# Patient Record
Sex: FEMALE | Race: WHITE | Employment: UNEMPLOYED | URBAN - METROPOLITAN AREA
[De-identification: names, ages, dates, MRNs, and addresses within clinical notes are randomized per-mention and may not be internally consistent; named-entity substitution may affect disease eponyms.]

---

## 2022-05-06 ENCOUNTER — HOSPITAL ENCOUNTER (EMERGENCY)
Age: 22
Discharge: HOME | End: 2022-05-06
Payer: MEDICAID

## 2022-05-06 ENCOUNTER — APPOINTMENT (OUTPATIENT)
Dept: GENERAL RADIOLOGY | Age: 22
End: 2022-05-06
Payer: MEDICAID

## 2022-05-06 VITALS
OXYGEN SATURATION: 100 % | HEART RATE: 87 BPM | SYSTOLIC BLOOD PRESSURE: 122 MMHG | RESPIRATION RATE: 20 BRPM | DIASTOLIC BLOOD PRESSURE: 64 MMHG | TEMPERATURE: 98.7 F

## 2022-05-06 DIAGNOSIS — M79.671 RIGHT FOOT PAIN: Primary | ICD-10-CM

## 2022-05-06 PROCEDURE — 73630 X-RAY EXAM OF FOOT: CPT

## 2022-05-06 PROCEDURE — 99283 EMERGENCY DEPT VISIT LOW MDM: CPT

## 2022-05-06 PROCEDURE — 73610 X-RAY EXAM OF ANKLE: CPT

## 2022-05-06 RX ORDER — NAPROXEN 500 MG/1
500 TABLET ORAL 2 TIMES DAILY WITH MEALS
Qty: 30 TABLET | Refills: 0 | Status: SHIPPED | OUTPATIENT
Start: 2022-05-06 | End: 2022-07-14 | Stop reason: ALTCHOICE

## 2022-05-06 ASSESSMENT — ENCOUNTER SYMPTOMS
CONSTIPATION: 0
VOMITING: 0
COLOR CHANGE: 0
SHORTNESS OF BREATH: 0
CHEST TIGHTNESS: 0
DIARRHEA: 0
RESPIRATORY NEGATIVE: 1
COUGH: 0
ABDOMINAL PAIN: 0
BACK PAIN: 0
NAUSEA: 0

## 2022-05-06 ASSESSMENT — PAIN SCALES - GENERAL: PAINLEVEL_OUTOF10: 4

## 2022-05-06 ASSESSMENT — PAIN - FUNCTIONAL ASSESSMENT: PAIN_FUNCTIONAL_ASSESSMENT: 0-10

## 2022-05-07 NOTE — ED NOTES
Discharge and education instructions reviewed. Patient verbalized understanding, teach-back successful. Patient denied questions at this time. No acute distress noted. Patient instructed to follow-up as noted - return to emergency department if symptoms worsen. Patient verbalized understanding. Discharged per EDMD with discharged instructions.        Mel Weeks RN  05/06/22 8162

## 2022-05-07 NOTE — ED PROVIDER NOTES
[facility]        Pt Name: Audie Tracy  MRN: [id number]  [place] 2000  Date of evaluation: 5/6/2022  Provider: YANELIS Burgos  PCP: No primary care provider on file. Note Started: 11:26 PM EDT       ALFREDA. I have evaluated this patient. My supervising physician was available for consultation. CHIEF COMPLAINT       Chief Complaint   Patient presents with    Foot Pain     Patient states R foot pain that has been going on for a while, states pain runs from the back of her foot all the way up her leg, denies any injury/trauma. Can ambulate normally. HISTORY OF PRESENT ILLNESS   (Location, Timing/Onset, Context/Setting, Quality, Duration, Modifying Factors, Severity, Associated Signs and Symptoms)  Note limiting factors. Chief Complaint: Right Foot Pain     Audie Tracy is a 24 y.o. female with no significant past medical history who presents to the ED with complaint of right foot pain. States she has pain to her right foot over the heel and to the base of the foot that she states been ongoing for quite some time. Cannot really remember when the pain started. Patient states she does do a lot of standing, walking and running. Patient states pain is worsened when she first wakes up in the morning and first gets out of bed. States pain will ease off throughout the day but then returns and usually in the evening. Patient states she can ambulate but has pain with ambulation. She denies any pain at rest.  Denies edema, ecchymosis, erythema or warmth. Denies fever or chills. Denies any numbness or tingling. Denies abrasion or laceration. Denies any injury or trauma. Denies any rashes or lesions. States pain is aching rated 4/10. Denies taking any over-the-counter medication for symptom control. Nursing Notes were all reviewed and agreed with or any disagreements were addressed in the HPI.     REVIEW OF SYSTEMS    (2-9 systems for level 4, 10 or more for level 5)     Review of Systems   Constitutional: Positive for activity change. Negative for appetite change, chills and fever. Respiratory: Negative. Negative for cough, chest tightness and shortness of breath. Cardiovascular: Negative. Negative for chest pain, palpitations and leg swelling. Gastrointestinal: Negative for abdominal pain, constipation, diarrhea, nausea and vomiting. Genitourinary: Negative for decreased urine volume, difficulty urinating, dysuria, flank pain, frequency, hematuria and urgency. Musculoskeletal: Positive for arthralgias, gait problem, joint swelling and myalgias. Negative for back pain, neck pain and neck stiffness. Skin: Negative for color change, pallor, rash and wound. Neurological: Negative for dizziness, weakness, light-headedness, numbness and headaches. Positives and Pertinent negatives as per HPI. Except as noted above in the ROS, all other systems were reviewed and negative. PAST MEDICAL HISTORY   History reviewed. No pertinent past medical history. SURGICAL HISTORY   History reviewed. No pertinent surgical history. [address]       Discharge Medication List as of 5/6/2022  9:41 PM            ALLERGIES     Latex and Prozac [fluoxetine]    FAMILYHISTORY     History reviewed. No pertinent family history.        SOCIAL HISTORY       Social History     Tobacco Use    Smoking status: Current Every Day Smoker     Packs/day: 0.50     Types: Cigarettes    Smokeless tobacco: Never Used   Substance Use Topics    Alcohol use: Never    Drug use: Never       SCREENINGS    [place] Coma Scale  Eye Opening: Spontaneous  Best Verbal Response: Oriented  Best Motor Response: Obeys commands  Stella Coma Scale Score: 15        PHYSICAL EXAM    (up to 7 for level 4, 8 or more for level 5)     ED Triage Vitals [05/06/22 2008]   BP Temp Temp Source Pulse Resp SpO2 Height Weight   122/64 98.7 °F (37.1 °C) Oral 87 20 100 % -- --       Physical Exam  Constitutional:       General: She is not in acute distress. Appearance: Normal appearance. She is well-developed. She is not ill-appearing, toxic-appearing or diaphoretic. HENT:      Head: Normocephalic and atraumatic. Right Ear: External ear normal.      Left Ear: External ear normal.   Eyes:      General:         Right eye: No discharge. Left eye: No discharge. Pulmonary:      Effort: Pulmonary effort is normal. No respiratory distress. Breath sounds: No stridor. Musculoskeletal:         General: Normal range of motion. Cervical back: Normal range of motion and neck supple. Comments: Upon examination patient has no tender palpation over the medial or lateral malleolus of the ankle. No tenderness over the knee or to the proximal fibula. There is no bony tenderness to palpation throughout the foot. Negative calcaneal squeeze. Achilles tendon intact with normal Kohler. Negative Homans' sign. There is no edema, ecchymosis, erythema or warmth noted throughout the right lower extremity. No abrasion or laceration. No rashes or lesions. Dorsalis pedis pulse and capillary refill brisk. Sensation intact to the medial lateral aspects of distal toes. Compartments are soft. Does have tender outpatient over the plantar fascia. Full range of motion and strength throughout the right lower extremity at the hip, knee, ankle and foot. Gait deferred at this time. Skin:     General: Skin is warm and dry. Coloration: Skin is not pale. Findings: No erythema or rash. Neurological:      Mental Status: She is alert and oriented to person, place, and time. Psychiatric:         Behavior: Behavior normal.         DIAGNOSTIC RESULTS   LABS:    Labs Reviewed - No data to display    When ordered only abnormal lab results are displayed. All other labs were within normal range or not returned as of this dictation. EKG:  When ordered, EKG's are interpreted by the Emergency Department Physician in the absence of a cardiologist.  Please see their note for interpretation of EKG. RADIOLOGY:   Non-plain film images such as CT, Ultrasound and MRI are read by the radiologist. Plain radiographic images are visualized and preliminarily interpreted by the ED Provider with the below findings:        Interpretation per the Radiologist below, if available at the time of this note:    XR FOOT RIGHT (MIN 3 VIEWS)   Final Result   1. No acute osseous abnormality involving the right ankle   2. No acute osseous abnormality involving the right foot         XR ANKLE RIGHT (MIN 3 VIEWS)   Final Result   1. No acute osseous abnormality involving the right ankle   2. No acute osseous abnormality involving the right foot           XR ANKLE RIGHT (MIN 3 VIEWS)    Result Date: 5/6/2022  EXAMINATION: 3  XRAY VIEWS OF THE RIGHT FOOT; THREE XRAY VIEWS OF THE RIGHT ANKLE 5/6/2022 2:38 pm COMPARISON: None. HISTORY: ORDERING SYSTEM PROVIDED HISTORY: pain TECHNOLOGIST PROVIDED HISTORY: Reason for exam:->pain Reason for Exam: pain FINDINGS: Three views of the right ankle demonstrate no evidence of an acute fracture or traumatic malalignment. The ankle mortise is intact. Soft tissues appear normal. Three views of the right foot demonstrate no evidence of an acute fracture or traumatic malalignment. Joint spaces are preserved. No foreign bodies are noted. 1. No acute osseous abnormality involving the right ankle 2. No acute osseous abnormality involving the right foot     XR FOOT RIGHT (MIN 3 VIEWS)    Result Date: 5/6/2022  EXAMINATION: 3  XRAY VIEWS OF THE RIGHT FOOT; THREE XRAY VIEWS OF THE RIGHT ANKLE 5/6/2022 2:38 pm COMPARISON: None.  HISTORY: ORDERING SYSTEM PROVIDED HISTORY: pain TECHNOLOGIST PROVIDED HISTORY: Reason for exam:->pain Reason for Exam: pain FINDINGS: Three views of the right ankle demonstrate no evidence of an acute fracture or traumatic malalignment. The ankle mortise is intact. Soft tissues appear normal. Three views of the right foot demonstrate no evidence of an acute fracture or traumatic malalignment. Joint spaces are preserved. No foreign bodies are noted. 1. No acute osseous abnormality involving the right ankle 2. No acute osseous abnormality involving the right foot           PROCEDURES   Unless otherwise noted below, none     Procedures    CRITICAL CARE TIME       CONSULTS:  None      EMERGENCY DEPARTMENT COURSE and DIFFERENTIAL DIAGNOSIS/MDM:   Vitals:    Vitals:    05/06/22 2008   BP: 122/64   Pulse: 87   Resp: 20   Temp: 98.7 °F (37.1 °C)   TempSrc: Oral   SpO2: 100%       Patient was given the following medications:  Medications - No data to display        Patient is a 66-year-old female who presents to the ED with complaint of right foot pain. Has had palpation over the plantar fascia on examination. There is full range of motion and strength of the right lower extremity. Distal neurovascular intact. No edema, ecchymosis, erythema or warmth noted. No abrasion or laceration. X-rays obtained and showed no abnormality to the right foot/ankle. Patient suffering from right foot pain. Concern for potential plantar fasciitis. Patient will be given a list of stretches for home. We will start anti-inflammatories. Follow-up with orthopedics. Return to ED if any worsening symptoms. Low suspicion for acute fracture, dislocation, septic arthritis, gout, cellulitis, abscess, DVT, arterial occlusion, nerve injury, vascular injury, compartment syndrome, proximal fibular fracture, Charcot foot, Achilles tendon rupture, Lisfranc injury or other emergent etiology at this time. FINAL IMPRESSION      1.  Right foot pain          DISPOSITION/PLAN   DISPOSITION Decision To Discharge 05/06/2022 09:39:55 PM      PATIENT REFERRED TO:  Edna Nicole MD  [address], [address] [facility]  [phone]    Schedule an appointment as soon as possible for a visit   As needed, If symptoms worsen    [facility] Emergency Department  [address]  Go to   As needed, If symptoms worsen      DISCHARGE MEDICATIONS:  Discharge Medication List as of 5/6/2022  9:41 PM      START taking these medications    Details   naproxen (NAPROSYN) 500 MG tablet Take 1 tablet by mouth 2 times daily (with meals), Disp-30 tablet, R-0Print             DISCONTINUED MEDICATIONS:  Discharge Medication List as of 5/6/2022  9:41 PM                 (Please note that portions of this note were completed with a voice recognition program.  Efforts were made to edit the dictations but occasionally words are mis-transcribed.)    YANELIS Marina (electronically signed)          YANELIS Sabillon  05/06/22 7964

## 2022-05-25 ENCOUNTER — TELEPHONE (OUTPATIENT)
Dept: PRIMARY CARE CLINIC | Age: 22
End: 2022-05-25

## 2022-05-25 ENCOUNTER — OFFICE VISIT (OUTPATIENT)
Dept: PSYCHOLOGY | Age: 22
End: 2022-05-25
Payer: MEDICAID

## 2022-05-25 ENCOUNTER — OFFICE VISIT (OUTPATIENT)
Dept: PRIMARY CARE CLINIC | Age: 22
End: 2022-05-25
Payer: MEDICAID

## 2022-05-25 VITALS
SYSTOLIC BLOOD PRESSURE: 104 MMHG | TEMPERATURE: 97 F | WEIGHT: 137 LBS | BODY MASS INDEX: 24.27 KG/M2 | HEART RATE: 89 BPM | DIASTOLIC BLOOD PRESSURE: 65 MMHG | HEIGHT: 63 IN

## 2022-05-25 DIAGNOSIS — M25.371 ANKLE INSTABILITY, RIGHT: ICD-10-CM

## 2022-05-25 DIAGNOSIS — Z11.59 ENCOUNTER FOR HEPATITIS C SCREENING TEST FOR LOW RISK PATIENT: ICD-10-CM

## 2022-05-25 DIAGNOSIS — F17.200 CURRENT SMOKER: ICD-10-CM

## 2022-05-25 DIAGNOSIS — F41.9 ANXIETY: ICD-10-CM

## 2022-05-25 DIAGNOSIS — Z00.00 ANNUAL PHYSICAL EXAM: Primary | ICD-10-CM

## 2022-05-25 DIAGNOSIS — F31.9 BIPOLAR AFFECTIVE DISORDER, REMISSION STATUS UNSPECIFIED (HCC): Primary | ICD-10-CM

## 2022-05-25 DIAGNOSIS — F51.01 PRIMARY INSOMNIA: ICD-10-CM

## 2022-05-25 DIAGNOSIS — F31.9 BIPOLAR 1 DISORDER (HCC): ICD-10-CM

## 2022-05-25 DIAGNOSIS — F41.1 GAD (GENERALIZED ANXIETY DISORDER): ICD-10-CM

## 2022-05-25 DIAGNOSIS — Z11.4 ENCOUNTER FOR SCREENING FOR HUMAN IMMUNODEFICIENCY VIRUS (HIV): ICD-10-CM

## 2022-05-25 PROCEDURE — 99385 PREV VISIT NEW AGE 18-39: CPT | Performed by: STUDENT IN AN ORGANIZED HEALTH CARE EDUCATION/TRAINING PROGRAM

## 2022-05-25 PROCEDURE — 99204 OFFICE O/P NEW MOD 45 MIN: CPT | Performed by: STUDENT IN AN ORGANIZED HEALTH CARE EDUCATION/TRAINING PROGRAM

## 2022-05-25 PROCEDURE — 90791 PSYCH DIAGNOSTIC EVALUATION: CPT | Performed by: PSYCHOLOGIST

## 2022-05-25 PROCEDURE — 96127 BRIEF EMOTIONAL/BEHAV ASSMT: CPT | Performed by: STUDENT IN AN ORGANIZED HEALTH CARE EDUCATION/TRAINING PROGRAM

## 2022-05-25 RX ORDER — VALACYCLOVIR HYDROCHLORIDE 500 MG/1
500 TABLET, FILM COATED ORAL DAILY
COMMUNITY
Start: 2022-02-25 | End: 2022-05-26 | Stop reason: SDUPTHER

## 2022-05-25 RX ORDER — CARIPRAZINE 1.5 MG/1
1.5 CAPSULE, GELATIN COATED ORAL DAILY
COMMUNITY
Start: 2022-05-02 | End: 2022-05-25

## 2022-05-25 RX ORDER — TRAZODONE HYDROCHLORIDE 50 MG/1
50 TABLET ORAL NIGHTLY PRN
Qty: 30 TABLET | Refills: 5 | Status: SHIPPED | OUTPATIENT
Start: 2022-05-25

## 2022-05-25 ASSESSMENT — ANXIETY QUESTIONNAIRES
7. FEELING AFRAID AS IF SOMETHING AWFUL MIGHT HAPPEN: 1
1. FEELING NERVOUS, ANXIOUS, OR ON EDGE: 3
6. BECOMING EASILY ANNOYED OR IRRITABLE: 3
7. FEELING AFRAID AS IF SOMETHING AWFUL MIGHT HAPPEN: 1-SEVERAL DAYS
6. BECOMING EASILY ANNOYED OR IRRITABLE: 3-NEARLY EVERY DAY
GAD7 TOTAL SCORE: 14
IF YOU CHECKED OFF ANY PROBLEMS ON THIS QUESTIONNAIRE, HOW DIFFICULT HAVE THESE PROBLEMS MADE IT FOR YOU TO DO YOUR WORK, TAKE CARE OF THINGS AT HOME, OR GET ALONG WITH OTHER PEOPLE: VERY DIFFICULT
2. NOT BEING ABLE TO STOP OR CONTROL WORRYING: 2-OVER HALF THE DAYS
2. NOT BEING ABLE TO STOP OR CONTROL WORRYING: 3
GAD7 TOTAL SCORE: 17
5. BEING SO RESTLESS THAT IT IS HARD TO SIT STILL: 3
3. WORRYING TOO MUCH ABOUT DIFFERENT THINGS: 2-OVER HALF THE DAYS
4. TROUBLE RELAXING: 2-OVER HALF THE DAYS
5. BEING SO RESTLESS THAT IT IS HARD TO SIT STILL: 2-OVER HALF THE DAYS
4. TROUBLE RELAXING: 3
1. FEELING NERVOUS, ANXIOUS, OR ON EDGE: 2
3. WORRYING TOO MUCH ABOUT DIFFERENT THINGS: 1

## 2022-05-25 ASSESSMENT — ENCOUNTER SYMPTOMS
CHEST TIGHTNESS: 0
ABDOMINAL PAIN: 0
BACK PAIN: 0
NAUSEA: 0
DIARRHEA: 0
CONSTIPATION: 0
SHORTNESS OF BREATH: 0

## 2022-05-25 ASSESSMENT — PATIENT HEALTH QUESTIONNAIRE - PHQ9
SUM OF ALL RESPONSES TO PHQ QUESTIONS 1-9: 17
SUM OF ALL RESPONSES TO PHQ QUESTIONS 1-9: 17
10. IF YOU CHECKED OFF ANY PROBLEMS, HOW DIFFICULT HAVE THESE PROBLEMS MADE IT FOR YOU TO DO YOUR WORK, TAKE CARE OF THINGS AT HOME, OR GET ALONG WITH OTHER PEOPLE: 1
SUM OF ALL RESPONSES TO PHQ QUESTIONS 1-9: 17
3. TROUBLE FALLING OR STAYING ASLEEP: 3
7. TROUBLE CONCENTRATING ON THINGS, SUCH AS READING THE NEWSPAPER OR WATCHING TELEVISION: 3
8. MOVING OR SPEAKING SO SLOWLY THAT OTHER PEOPLE COULD HAVE NOTICED. OR THE OPPOSITE, BEING SO FIGETY OR RESTLESS THAT YOU HAVE BEEN MOVING AROUND A LOT MORE THAN USUAL: 1
2. FEELING DOWN, DEPRESSED OR HOPELESS: 1
6. FEELING BAD ABOUT YOURSELF - OR THAT YOU ARE A FAILURE OR HAVE LET YOURSELF OR YOUR FAMILY DOWN: 1
SUM OF ALL RESPONSES TO PHQ QUESTIONS 1-9: 17
5. POOR APPETITE OR OVEREATING: 3
SUM OF ALL RESPONSES TO PHQ9 QUESTIONS 1 & 2: 3
1. LITTLE INTEREST OR PLEASURE IN DOING THINGS: 2
4. FEELING TIRED OR HAVING LITTLE ENERGY: 3

## 2022-05-25 NOTE — PROGRESS NOTES
[facility]  Establish care visit   2022    Sanjeev Munoz (:  2000) is a 25 y.o. female, here to establish care. Chief Complaint   Patient presents with   [employer] Other     also needed marito to see crystal         ASSESSMENT/ PLAN  1. Annual physical exam  Screening labs ordered, patient declines all vaccines today. Recommend 150 minutes of exercise weekly and healthy dietary choices. Records request for Pap smear.  - Comprehensive Metabolic Panel; Future  - Hemoglobin A1C; Future  - Lipid Panel; Future    2. Bipolar 1 disorder (HCC)  Uncontrolled, increase Vraylar to 3 mg today. Referrals placed for psychiatry and psychology  - Ambulatory referral to Psychiatry  - Ambulatory referral to Psychology  - cariprazine hcl (VRAYLAR) 3 MG CAPS capsule; Take 1 capsule by mouth daily  Dispense: 30 capsule; Refill: 2    3. Primary insomnia  Uncontrolled, will reinitiate trazodone at a lower dose for better tolerability. - traZODone (DESYREL) 50 MG tablet; Take 1 tablet by mouth nightly as needed for Sleep  Dispense: 30 tablet; Refill: 5    4. OWEN (generalized anxiety disorder)  Uncontrolled, will adjust Vraylar per above and reassess. PHQ-9 Total Score: 17 (2022  8:56 AM)    OWEN 7 SCORE 2022   OWEN-7 Total Score 17     Interpretation of OWEN-7 score: 5-9 = mild anxiety, 10-14 = moderate anxiety, 15+ = severe anxiety. Recommend referral to behavioral health for scores 10 or greater. - Ambulatory referral to Psychiatry  - Ambulatory referral to Psychology    5. Current smoker  Precontemplative about cessation    6. Encounter for hepatitis C screening test for low risk patient  Screen  - Hepatitis C Antibody; Future    7. Encounter for screening for human immunodeficiency virus (HIV)  Screen  - HIV Screen; Future    8. Ankle instability, right  Uncontrolled, likely secondary to recurrent ankle sprains with ligament laxity.   Continue wearing ASO ankle brace as needed and referral placed to physical therapy. X-ray reviewed. - Ambulatory referral to Physical Therapy       Return in about 1 year (around 5/25/2023) for annual physical.    HPI  Patient presents today to establish care with concerns of bipolar, anxiety, insomnia and ankle pain/instability. Endorses previous diagnoses of bipolar, anxiety and currently taking 1.5 mg Vraylar. She is looking to establish with new psychologist and psychiatrist.  Trisha Prudent persistent, uncontrolled anxiety and depression. No recent manic episodes, but states that these are characterized by multiple days without sleep and not feeling tired, rapid and pressured speech. Notes issues with sleep, was previously taking 100 mg of trazodone which caused morning drowsiness. She would like to try a lower dose. She also endorses chronic right ankle pain, instability. States that she may have sprained it while running in seventh grade via eversion injury. She wears a brace daily, but endorses continued weakness.  + Intermittent swelling. No numbness or weakness of her toes. Was seen in the emergency department a few weeks ago, ankle x-ray was unremarkable. Patient lives at home with her ex-, boyfriend, 2 children. She works at [employer]. Birth control is tubal ligation. Last Pap smear was a year ago, and normal.  She declines all vaccines. No regular exercise or dietary plan; states that she eats way too much [employer]. ROS  Review of Systems   Constitutional: Negative for activity change, appetite change, fatigue, fever and unexpected weight change. Eyes: Negative for visual disturbance. Respiratory: Negative for chest tightness and shortness of breath. Cardiovascular: Negative for palpitations and leg swelling. Gastrointestinal: Negative for abdominal pain, constipation, diarrhea and nausea. Genitourinary: Negative for difficulty urinating and dysuria.    Musculoskeletal: Positive for arthralgias and joint swelling. Negative for back pain, gait problem, neck pain and neck stiffness. Neurological: Negative for weakness, numbness and headaches. Psychiatric/Behavioral: Positive for agitation, decreased concentration and sleep disturbance. Negative for self-injury and suicidal ideas. The patient is nervous/anxious. HISTORIES  Current Outpatient Medications on File Prior to Visit   Medication Sig Dispense Refill    valACYclovir (VALTREX) 500 MG tablet Take 500 mg by mouth daily      naproxen (NAPROSYN) 500 MG tablet Take 1 tablet by mouth 2 times daily (with meals) 30 tablet 0     No current facility-administered medications on file prior to visit.         Allergies   Allergen Reactions    Latex     Prozac [Fluoxetine]        Past Medical History:   Diagnosis Date    Bipolar 1 disorder ([facility]        Patient Active Problem List   Diagnosis    OWEN (generalized anxiety disorder)    Primary insomnia    Bipolar 1 disorder ([facility]    Current smoker       Past Surgical History:   Procedure Laterality Date     SECTION      TUBAL LIGATION         Social History     Socioeconomic History    Marital status:      Spouse name: elena kitchen    Number of children: 2    Years of education: Not on file    Highest education level: Not on file   Occupational History     Comment: [employer]   Tobacco Use    Smoking status: Current Every Day Smoker     Packs/day: 0.25     Types: Cigarettes    Smokeless tobacco: Never Used   Vaping Use    Vaping Use: Every day    Substances: Nicotine   Substance and Sexual Activity    Alcohol use: Yes     Comment: occo    Drug use: Yes     Types: Marijuana Rodwyalon Figueroa)     Comment: delta 8    Sexual activity: Yes     Partners: Male     Birth control/protection: None   Other Topics Concern    Not on file   Social History Narrative    She lives with her ex , boyfriend and 2 kids      Social Determinants of Health     Financial Resource Strain:     Difficulty of Paying Living Expenses: Not on file   Food Insecurity:     Worried About [address] [employer] in the Last Year: Not on file    Stephen of Food in the Last Year: Not on file   Transportation Needs:     Lack of Transportation (Medical): Not on file    Lack of Transportation (Non-Medical): Not on file   Physical Activity:     Days of Exercise per Week: Not on file    Minutes of Exercise per Session: Not on file   Stress:     Feeling of Stress : Not on file   Social Connections:     Frequency of Communication with Friends and Family: Not on file    Frequency of Social Gatherings with Friends and Family: Not on file    Attends Ep[employer]opalian Services: Not on file    Active Member of [address] [place] [address] or Organizations: Not on file    Attends Club or Organization Meetings: Not on file    Marital Status: Not on file   Intimate Partner Violence:     Fear of Current or Ex-Partner: Not on file    Emotionally Abused: Not on file    Physically Abused: Not on file    Sexually Abused: Not on file   Housing Stability:     Unable to Pay for Housing in the Last Year: Not on file    Number of [place] in the Last Year: Not on file    Unstable Housing in the Last Year: Not on file        History reviewed. No pertinent family history. PE  Vitals:    05/25/22 0857   BP: 104/65   Site: Right Upper Arm   Position: Sitting   Cuff Size: Medium Adult   Pulse: 89   Temp: 97 °F (36.1 °C)   TempSrc: Temporal   Weight: 137 lb (62.1 kg)   Height: 5' 2.99\" (1.6 m)     Estimated body mass index is 24.27 kg/m² as calculated from the following:    Height as of this encounter: 5' 2.99\" (1.6 m). Weight as of this encounter: 137 lb (62.1 kg). Physical Exam  Vitals reviewed. Constitutional:       General: She is not in acute distress. Appearance: Normal appearance. HENT:      Head: Normocephalic and atraumatic. Eyes:      Extraocular Movements: Extraocular movements intact. Pupils: Pupils are equal, round, and reactive to light. Cardiovascular:      Rate and Rhythm: Normal rate and regular rhythm. Pulses: Normal pulses. Heart sounds: Normal heart sounds. No murmur heard. No gallop. Pulmonary:      Effort: Pulmonary effort is normal.      Breath sounds: Normal breath sounds. No wheezing or rales. Abdominal:      General: Abdomen is flat. Palpations: Abdomen is soft. Musculoskeletal:         General: No tenderness, deformity or signs of injury. Normal range of motion. Cervical back: Normal range of motion. Right lower leg: No edema. Left lower leg: No edema. Comments: Strength and sensation intact right lower extremity, nonantalgic gait. No significant edema, some tenderness with eversion. Plantar and dorsiflexion intact. Skin:     General: Skin is warm and dry. Capillary Refill: Capillary refill takes less than 2 seconds. Neurological:      General: No focal deficit present. Mental Status: She is alert. Sensory: No sensory deficit. Motor: No weakness. Gait: Gait normal.   Psychiatric:         Mood and Affect: Mood normal.         Behavior: Behavior normal.           There is no immunization history on file for this patient. Health Maintenance   Topic Date Due    COVID-19 Vaccine (1) Never done    Pneumococcal 0-64 years Vaccine (1 - PCV) Never done    HPV vaccine (1 - 2-dose series) Never done    Depression Monitoring  Never done    HIV screen  Never done    Chlamydia screen  Never done    Hepatitis C screen  Never done    DTaP/Tdap/Td vaccine (1 - Tdap) Never done    Pap smear  Never done    Flu vaccine (Season Ended) 09/01/2022    Hepatitis A vaccine  Aged Out    Hepatitis B vaccine  Aged Out    Hib vaccine  Aged Out    Meningococcal (ACWY) vaccine  Aged Out    Varicella vaccine  Discontinued       PSH, PMH, SH and  reviewed and noted. Recent and past labs, tests and consults also reviewed. Recent or new meds also reviewed.     Marcellus Krishnamurthy DO Bo    This dictation was generated by voice recognition computer software. Although all attempts are made to edit the dictation for accuracy, there may be errors in the transcription that are not intended.

## 2022-05-25 NOTE — TELEPHONE ENCOUNTER
cariprazine hcl (VRAYLAR) 3 MG CAPS capsule [[id number]]     Order Details  Dose: 3 mg Route: Oral Frequency: DAILY   Dispense Quantity: 30 capsule Refills: 2          Sig: Take 1 capsule by mouth daily         Start Date: 05/25/22 End Date: --   Written Date: 05/25/22 Expiration Date: 05/25/23       Diagnosis Association: Bipolar 1 disorder ([facility] (F31.9)     Providers    Authorizing Provider: Kathy Aguilar DO NPI: [id number]   Ordering User:  Kathy Aguilar DO          Pharmacy    [address], [place] - [address] [phone] - F [phone]   [address]   Phone:  [phone]  Fax:  [phone]
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
## 2022-05-25 NOTE — PATIENT INSTRUCTIONS
1. Call for individual psychotherapy:     [address]  [address],   [address] 3  Phone: [phone]     Fax: [phone]    -confirm insurance  -female therapist  -anxiety/trauma specialist  -in person     2. Continue to take psychiatric medication as prescribed, go to Dr Rich Melton as needed until get to psychiatry  3. Go to Dr Phil Donaldson on 7/19 for medication management (psychiatry)  4.  No further follow up with Dr Dyan Bradley required, return as needed

## 2022-05-26 ENCOUNTER — TELEPHONE (OUTPATIENT)
Dept: PRIMARY CARE CLINIC | Age: 22
End: 2022-05-26

## 2022-05-26 DIAGNOSIS — A60.9 HSV (HERPES SIMPLEX VIRUS) ANOGENITAL INFECTION: Primary | ICD-10-CM

## 2022-05-26 RX ORDER — VALACYCLOVIR HYDROCHLORIDE 500 MG/1
500 TABLET, FILM COATED ORAL DAILY
Qty: 30 TABLET | Refills: 11 | Status: SHIPPED | OUTPATIENT
Start: 2022-05-26

## 2022-05-26 NOTE — TELEPHONE ENCOUNTER
----- Message from Alex Terrell sent at 5/26/2022 12:14 PM EDT -----  Subject: Message to Provider    QUESTIONS  Information for Provider? Brittanyivonne Rogeena will need a prior authorization for the   cariprazine hcl (VRAYLAR) 3 MG CAPS capsule. [facility] Medicaid will not cover   until a PA is given. They can be reached at [phone]. Brittanyivonne Bello can be   reached at [phone] ok to leave a message  ---------------------------------------------------------------------------  --------------  [address]  What is the best way for the office to contact you? OK to leave message on   voicemail  Preferred Call Back Phone Number? [phone]  ---------------------------------------------------------------------------  --------------  SCRIPT ANSWERS  Relationship to Patient?  Self

## 2022-05-26 NOTE — TELEPHONE ENCOUNTER
Submitted PA for Vraylar 3MG capsules  [facility] [employer] [facility] Key: [id number] STATUS: APPROVED 05/26/22 - 05/26/23; Approval letter attached. If this requires a response please respond to the pool ( P [facility]). Thank you please advise patient.

## 2022-05-26 NOTE — TELEPHONE ENCOUNTER
Medication:   Requested Prescriptions     Pending Prescriptions Disp Refills    valACYclovir (VALTREX) 500 MG tablet       Sig: Take 1 tablet by mouth daily        Last Filled:   Reported 02/25/22    Patient Phone Number: [phone] (home)     Last appt: 5/25/2022 Return in about 1 year (around 5/25/2023) for annual physical.    Next appt: Visit date not found    Last OARRS: No flowsheet data found.

## 2022-05-26 NOTE — TELEPHONE ENCOUNTER
----- Message from Pete Kanner sent at 5/26/2022 12:07 PM EDT -----  Subject: Refill Request    QUESTIONS  Name of Medication? valACYclovir (VALTREX) 500 MG tablet  Patient-reported dosage and instructions? 1 a day  How many days do you have left? 1  Preferred Pharmacy? [address]  Pharmacy phone number (if available)? [phone]  Additional Information for Provider? would like a refill of her Valtrex   due to only having one left. It can be sent to [facility] DR TARIK WOLFE. She can   be reached at [phone] ok to leave a message  ---------------------------------------------------------------------------  --------------  [address]  What is the best way for the office to contact you? OK to leave message on   voicemail  Preferred Call Back Phone Number? [phone]  ---------------------------------------------------------------------------  --------------  SCRIPT ANSWERS  Relationship to Patient?  Self

## 2022-06-08 ENCOUNTER — HOSPITAL ENCOUNTER (OUTPATIENT)
Dept: PHYSICAL THERAPY | Age: 22
Setting detail: THERAPIES SERIES
Discharge: HOME | End: 2022-06-08
Payer: MEDICAID

## 2022-06-08 PROCEDURE — 97110 THERAPEUTIC EXERCISES: CPT

## 2022-06-08 PROCEDURE — 97161 PT EVAL LOW COMPLEX 20 MIN: CPT

## 2022-06-08 NOTE — FLOWSHEET NOTE
[address] [place] [address]  Phone: [phone]   Fax: [phone]    Physical Therapy Treatment Note/ Progress Report:     Date:  2022    Patient Name:  Seun Haddad    :  2000  MRN: [id number]  Restrictions/Precautions:    Medical/Treatment Diagnosis Information:  Diagnosis: A11.375 (ICD-10-CM) - Ankle instability, right  Treatment Diagnosis: Decreased R ankle AROM, decreased R ankle strength, decreased R ankle postural control, decreased proximal hip strength  Insurance/Certification information:  PT Insurance Information: Medicaid  Physician Information:  Ashley Zuñiga DO  Plan of care signed (Y/N): []  Yes [x]  No     Date of Patient follow up with Physician: PRN     Progress Report: []  Yes  [x]  No     Date Range for reporting period:  Beginnin22  Ending:     Progress report due (10 Rx/or 30 days whichever is less): visit #10 or 30 (date)     Recertification due (POC duration/ or 90 days whichever is less): visit #12 or 22 (date)     Visit # Insurance Allowable Auth required?  Date Range   1 30; PA required after 30 visits []  Yes  [x]  No        Latex Allergy:  []NO      [x]YES  Preferred Language for Healthcare:   [x]English       []other:    Functional Scale:           Date assessed:  TO physical FS primary measure score = 39; risk adjusted = 53  22    Pain level:  610     SUBJECTIVE:  See eval    OBJECTIVE: See eval      RESTRICTIONS/PRECAUTIONS: anxiety, depression, smoker (>.5 ppd), Bipolar Type I    Exercises/Interventions:     Therapeutic Exercise (84537)  Resistance / level Sets/sec Reps Notes / Cues   Stepper        Gastroc Stretch  HR   Soleus Stretch        Standing hip ABD  Standing hip ext       Resisted R ankle:   - DF   - INV  - EV       Squats with half roll or TG                                   HEP:   - Standing Gastroc Stretch   - Standing Soleus Stretch   - Toe curls   - Seated Ankle Circles   - Tennis ball roll outs    x10 min    Therapeutic Activities (28237)       Step ups                             Neuromuscular Re-ed (51377)       SLS        Wobble board:   - A/P taps  - A/P hold   - M/L taps   - M/L hold       Arch doming       Toe yoga       MWM tib over foot                            Manual Intervention (34297)       Knee mobs/PROM       Tib/Fem Mobs       Patella Mobs       Ankle mobs                         Modalities:     Pt. Education:  -pt educated on diagnosis, prognosis and expectations for rehab  -all pt questions were answered    Home Exercise Program:  Access Code: [email or web address]  URL: [email or web address]  Date: 06/08/2022  Prepared by: Lsahay Luna    Exercises  Gastroc Stretch on Wall - 1 x daily - 7 x weekly - 1 sets - 3 reps - 30 hold  Standing Soleus Stretch - 1 x daily - 7 x weekly - 1 sets - 3 reps - 30 hold  Seated Toe Curl - 1 x daily - 7 x weekly - 2 sets - 10 reps  Supine Ankle Circles - 1 x daily - 7 x weekly - 2 sets - 10 reps      Therapeutic Exercise and NMR EXR  [x] (01223) Provided verbal/tactile cueing for activities related to strengthening, flexibility, endurance, ROM for improvements in LE, proximal hip, and core control with self care, mobility, lifting, ambulation.  [] (97778) Provided verbal/tactile cueing for activities related to improving balance, coordination, kinesthetic sense, posture, motor skill, proprioception  to assist with LE, proximal hip, and core control in self care, mobility, lifting, ambulation and eccentric single leg control.   [] (95635) Therapist is in constant attendance of 2 or more patients providing skilled therapy interventions, but not providing any significant amount of measurable one-on-one time to either patient, for improvements in LE, proximal hip, and core control in self care, mobility, lifting, ambulation and eccentric single leg control.      NMR and Therapeutic Activities:    [] (22491 or 19580) Provided verbal/tactile cueing for activities related to improving balance, coordination, kinesthetic sense, posture, motor skill, proprioception and motor activation to allow for proper function of core, proximal hip and LE with self care and ADLs  [] (98473) Gait Re-education- Provided training and instruction to the patient for proper LE, core and proximal hip recruitment and positioning and eccentric body weight control with ambulation re-education including up and down stairs     Home Exercise Program:    [x] (93420) Reviewed/Progressed HEP activities related to strengthening, flexibility, endurance, ROM of core, proximal hip and LE for functional self-care, mobility, lifting and ambulation/stair navigation   [] (95182)Reviewed/Progressed HEP activities related to improving balance, coordination, kinesthetic sense, posture, motor skill, proprioception of core, proximal hip and LE for self care, mobility, lifting, and ambulation/stair navigation      Manual Treatments:  PROM / STM / Oscillations-Mobs:  G-I, II, III, IV (PA's, Inf., Post.)  [] (10213) Provided manual therapy to mobilize LE, proximal hip and/or LS spine soft tissue/joints for the purpose of modulating pain, promoting relaxation,  increasing ROM, reducing/eliminating soft tissue swelling/inflammation/restriction, improving soft tissue extensibility and allowing for proper ROM for normal function with self care, mobility, lifting and ambulation. Modalities:  [] (19736) Vasopneumatic compression: Utilized vasopneumatic compression to decrease edema / swelling for the purpose of improving mobility and quad tone / recruitment which will allow for increased overall function including but not limited to self-care, transfers, ambulation, and ascending / descending stairs.        Charges:  Timed Code Treatment Minutes: 10   Total Treatment Minutes: 45     [x] EVAL - LOW (99466)   [] EVAL - MOD (72670)  [] EVAL - HIGH (68966)  [] RE-EVAL (37556)  [x] VU(23500) x 1     [] Ionto  [] NMR (75847) x       [] Vaso  [] Manual (98720) x       [] Ultrasound  [] TA x        [] Mech Traction (87964)  [] Aquatic Therapy x      [] ES (un) (86608):   [] Home Management Training x  [] ES(attended) (82077)   [] Dry Needling 1-2 muscles (87170):  [] Dry Needling 3+ muscles (972198  [] Group:      [] Other:     GOALS:  Patient stated goal: \"standing/walking for longer periods of time\"  [] Progressing: [] Met: [] Not Met: [] Adjusted    Therapist goals for Patient:   Short Term Goals: To be achieved in: 2 weeks  1. Independent in HEP and progression per patient tolerance, in order to prevent re-injury. [] Progressing: [] Met: [] Not Met: [] Adjusted  2. Patient will have a decrease in pain to facilitate improvement in movement, function, and ADLs as indicated by Functional Deficits. [] Progressing: [] Met: [] Not Met: [] Adjusted    Long Term Goals: To be achieved in: 6 weeks  1. FOTO score of at least 65 to assist with reaching prior level of function. [] Progressing: [] Met: [] Not Met: [] Adjusted  2. Patient will demonstrate increased R ankle AROM to at least DF to 15 deg, PF to 70 deg, INV to 30 deg, and EV 15 deg to allow patient to demonstrate a more functional gait and return to running. [] Progressing: [] Met: [] Not Met: [] Adjusted  3. Patient will demonstrate an increase in R ankle strength to at least 4+/5  in all directions, and proximal strength to at least 4+/5 to allow patient to tolerate prolonged walking and standing, as well as returning to a running program.   [] Progressing: [] Met: [] Not Met: [] Adjusted  4. Patient will return to functional activities including working 8 hours shifts and recreational running without increased symptoms. [] Progressing: [] Met: [] Not Met: [] Adjusted    Overall Progression Towards Functional goals/ Treatment Progress Update:  [] Patient is progressing as expected towards functional goals listed.     [] Progression is slowed due to complexities/Impairments listed. [] Progression has been slowed due to co-morbidities. [x] Plan just implemented, too soon to assess goals progression <30days   [] Goals require adjustment due to lack of progress  [] Patient is not progressing as expected and requires additional follow up with physician  [] Other    Persisting Functional Limitations/Impairments:  []Sitting [x]Standing   [x]Walking [x]Stairs   []Transfers []ADLs   [x]Squatting/bending []Kneeling  []Housework [x]Job related tasks  []Driving []Sports/Recreation   []Sleeping []Other:    ASSESSMENT:  See eval  Treatment/Activity Tolerance:  [x] Pt able to complete treatment [] Patient limited by fatique  [] Patient limited by pain  [] Patient limited by other medical complications  [] Other:     Prognosis:  [x] Good [] Fair  [] Poor    Patient Requires Follow-up: [x] Yes  [] No    Return to Play:    [x]  N/A   []  Stage 1: Intro to Strength   []  Stage 2: Return to Run and Strength   []  Stage 3: Return to Jump and Strength   []  Stage 4: Dynamic Strength and Agility   []  Stage 5: Sport Specific Training     []  Ready to Return to Play, Meets All Above Stages   []  Not Ready for Return to Sports   Comments:            PLAN: See eval. PT 2x / week for 6 weeks. [] Continue per plan of care [] Alter current plan (see comments)  [x] Plan of care initiated [] Hold pending MD visit [] Discharge    Electronically signed by: Latisha Zhao, PT, DPT  Therapist was present, directed the patient's care, made skilled judgement, and was responsible for assessment and treatment of the patient. Chick Light, SPT      Note: If patient does not return for scheduled/ recommended follow up visits, this note will serve as a discharge from care along with most recent update on progress.

## 2022-06-08 NOTE — PLAN OF CARE
[address] [address], [facility] [address]  Phone: [phone]   Fax: [phone]                                                       Physical Therapy Certification    Dear Jaden Salas DO,    We had the pleasure of evaluating the following patient for physical therapy services at [address] [place] [address]. A summary of our findings can be found in the initial assessment below. This includes our plan of care. If you have any questions or concerns regarding these findings, please do not hesitate to contact me at the office phone number checked above. Thank you for the referral.       Physician Signature:_______________________________Date:__________________  By signing above (or electronic signature), therapists plan is approved by physician      Patient: Destiny Pringle   : 2000   MRN: [id number]  Referring Physician: Jaden Salas DO      Evaluation Date: 2022      Medical Diagnosis Information:  Diagnosis: X38.704 (ICD-10-CM) - Ankle instability, right   Treatment Diagnosis: Decreased R ankle AROM, decreased R ankle strength, decreased R ankle postural control, decreased proximal hip strength                                         Insurance information: PT Insurance Information: Medicaid     Precautions/ Contra-indications: N/A  Latex Allergy:  []NO      [x]YES  Preferred Language for Healthcare:   [x]English       []Other:    C-SSRS Triggered by Intake questionnaire (Past 2 wk assessment ):   [] No, Questionnaire did not trigger screening. [x] Yes, Patient intake triggered C-SSRS Screening     [x] Completed, no further action required. [] Completed, PCP notified via Epic    SUBJECTIVE: Patient stated complaint is R medial ankle pain, pain in the plantar aspect of the foot - centrally, under arch, and underneath the great toe.  Pt reports occasional sharp pain that travels to medial knee with certain activity/movement. In general, R medial ankle has a deep, achey pain constantly (at least a 4/10). She states that standing and walking for prolonged periods of time can bring an onset of N/T in medial ankle/calf. Pt states that in high school (approx 15-11 yo) she twisted her ankle at a track meet which brought on the initial onset of R ankle pain. Current onset was insidious and she has been wearing an ankle stability brace each day for the past month. She has trialed arch supports in her shoes previously but states that it makes her plantar pain worse. She has unfortunately not been able to wear brace at work because it does not fit into tennis shoes, but she has recently purchased shoes that allow brace to fit. She has trialed ice for symptom management but is not fond of the sensation. In general, she states that the morning is more difficult for her ankle but the pain will increasingly get worse with activity throughout the day. Depression screen was completed and negative for further action. Pt states that she already follows up with mental health professional.     Relevant Medical History: anxiety, depression, smoker (>.5 ppd), Bipolar Type I   Functional Outcome: FOTO physical FS primary measure score = 38; Risk adjusted = 53    Pain Scale: 5/10, can get to an 11/10, lowest 4/10  Easing factors: rest, brace, naproxen   Provocative factors: running, walking, standing for long periods of time      Type: [x]Constant   [x]Intermittent  [x]Radiating [x]Localized []other:     Numbness/Tingling: pt stats occasional numbness and tingling that travels from R medial foot to medial knee joint     Occupation/School: works at [employer] part time. A typical shift is 5-8 hours; recently had an episode where after standing/walkng for 1.5 hours she was unable to walk anymore.      Living Status/Prior Level of Function:Prior to this injury / incident, pt was independent with ADLs and IADLs. Continues to be independent. No stairs and walk in shower; if she's had a painful day, she will have difficulty getting in and out of the tub if she chooses that tub/shower. OBJECTIVE:   Palpation: TTP with great toe ext; TTP middle of R TC joint; TTP plantar surface of R foot    Functional Mobility/Transfers:  WNL    Posture: WNL sitting - navicular lower on R, arch present B  + navicular drop in WB B    Bandages/Dressings/Incisions: R ankle stability brace     Gait: (include devices/WB status): no AD; decreased push off with R foot; decreased R DF     Lumbar AROM screen - consider screening at future visits if N/T persists: [] [facility]  [] abnormal:     PROM AROM    L R L R   Hip Flexion       Hip Abduction       Hip ER       Hip IR       Knee Flexion   WNL WNL   Knee Extension   WNL WNL   Dorsiflexion n 0 deg  18 deg 20 deg from neutral before pain   Plantarflexion    73 deg 26 deg   Inversion    30 deg 20 deg   Eversion    20 deg 6 deg       Strength (0-5) / Myotomes Left Right   Hip Flexion - supine     Hip Flexion - seated (L1-2) 5 4   Hip Abduction 4+ 4-   Hip Adduction     Hip ER     Hip IR     Quads (L2-4) 5 4-   Hamstrings 5 4-   Ankle Dorsiflexion (L4-5) 5 4*   Ankle Plantarflexion (S1-2) (seated) 5 5*   Ankle Inversion 5 3*   Ankle Eversion (S1-2) 5 3+*   Great Toe Extension (L5) 5 3+*        Flexibility     Gastroc WNL Mild to mod restriction   Soleus WNL Mild to mod restriction        Hamstrings (90/90)     ITB Meldon Le)     Quads (Ely's)     Hip Flexor Mickeal Cancel)          Girth (cm)     Mid patella     Suprapatellar     Figure 8     Transmalleolar     Metatarsal Heads         Joint mobility:     []Normal    [x]Hypo mild with R A-P glide TC jt and talar tilt   []Hyper    Orthopaedic Special Tests  Positive  Negative  NT Comments    Hip   x    EKTA / Nikita's       FADIR       Scour       Trendelenburg              Knee   x    Lachman's / Anterior Drawer       Posterior Drawer       Varus Stress       Valgus Stress       Natalya's        Appley's       Thessaly's       Patellar Tracking              Ankle       Anterior Drawer  x     Talar Tilt  x     Kohler       Dimitrios's        Navicular Drop x          Balance: tandem balance: 10+ sec, B min increase in sway, SL 10+ sec B increased symptoms on R                          [x] Patient history, allergies, meds reviewed. Medical chart reviewed. See intake form. Review Of Systems (ROS):  [x]Performed Review of systems (Integumentary, CardioPulmonary, Neurological) by intake and observation. Intake form has been scanned into medical record. Patient has been instructed to contact their primary care physician regarding ROS issues if not already being addressed at this time.       Co-morbidities/Complexities (which will affect course of rehabilitation):   []None        []Hx of COVID   Arthritic conditions   []Rheumatoid arthritis (M05.9)  []Osteoarthritis (M19.91)  []Gout   Cardiovascular conditions   []Hypertension (I10)  []Hyperlipidemia (E78.5)  []Angina pectoris (I20)  []Atherosclerosis (I70)  []Pacemaker  []Hx of CABG/stent/  cardiac surgeries   Musculoskeletal conditions   []Disc pathology   []Congenital spine pathologies   []Osteoporosis (M81.8)  []Osteopenia (M85.8)  []Scoliosis       Endocrine conditions   []Hypothyroid (E03.9)  []Hyperthyroid Gastrointestinal conditions   []Constipation (I55.87)   Metabolic conditions   []Morbid obesity (E66.01)  []Diabetes type 1(E10.65) or 2 (E11.65)   []Neuropathy (G60.9)     Cardio/Pulmonary conditions   []Asthma (J45)  []Coughing   []COPD (J44.9)  []CHF  []A-fib   Psychological Disorders  [x]Anxiety (F41.9)  [x]Depression (F32.9)   [x]Other: Bipolar Type I  Developmental Disorders  []Autism (F84.0)  []CP (G80)  []Down Syndrome (Q90.9)  []Developmental delay     Neurological conditions  []Prior Stroke (I69.30)  []Parkinson's (G20)  []Encephalopathy (G93.40)  []MS (G35)  []Post-polio (G14)  []SCI  []TBI  []ALS Other conditions  []Fibromyalgia (M79.7)  []Vertigo  []Syncope  []Kidney Failure  []Cancer      []currently undergoing                treatment  []Pregnancy  []Incontinence   Prior surgeries  []involved limb  []previous spinal surgery  [] section birth  []hysterectomy  []bowel / bladder surgery  []other relevant surgeries   [x]Other:  PMHx of 2 c-sections and tubal ligation           Barriers to/and or personal factors that will affect rehab potential:              []Age  []Sex    [x]Smoker              []Motivation/Lack of Motivation                        [x]Co-Morbidities              []Cognitive Function, education/learning barriers              []Environmental, home barriers              []profession/work barriers  []past PT/medical experience  []other:  Justification:     Falls Risk Assessment (30 days):   [x] Falls Risk assessed and no intervention required. [] Falls Risk assessed and Patient requires intervention due to being higher risk   TUG score (>12s at risk):     [] Falls education provided, including        ASSESSMENT: Pt is a 24 yo female referred to PT for R ankle pain and instability. She presents with deficits in R ankle and great toe AROM, strength, proximal hip strength and ankle postural control. These deficits contribute to pain and decreased functional status. Signs and symptoms are consistent with R posterior tibialis tendinosis and plantar fascitis.  She will benefit from skilled therapy to address these deficits, tolerate work duties and return to recreational running program.    Functional Impairments:     []Noted lumbar/proximal hip/LE joint hypomobility   [x]Decreased LE functional ROM   []Decreased core/proximal hip strength and neuromuscular control   [x]Decreased LE functional strength   [x]Reduced balance/proprioceptive control   []other:      Functional Activity Limitations (from functional questionnaire and intake)   [x]Reduced ability to tolerate prolonged functional positions   []Reduced ability or difficulty with changes of positions or transfers between positions   [x]Reduced ability to maintain good posture and demonstrate good body mechanics with sitting, bending, and lifting   []Reduced ability to sleep   [] Reduced ability or tolerance with driving and/or computer work   [x]Reduced ability to perform lifting, carrying tasks   [x]Reduced ability to squat   []Reduced ability to forward bend   [x]Reduced ability to ambulate prolonged functional periods/distances/surfaces   [x]Reduced ability to ascend/descend stairs   [x]Reduced ability to run, hop, cut or jump   []other:    Participation Restrictions   []Reduced participation in self care activities   []Reduced participation in home management activities   [x]Reduced participation in work activities   [x]Reduced participation in social activities. [x]Reduced participation in sport/recreation activities. Classification :    []Signs/symptoms consistent with post-surgical status including decreased ROM, strength and function.    [x]Signs/symptoms consistent with joint sprain/strain   []Signs/symptoms consistent with patella-femoral syndrome   []Signs/symptoms consistent with knee OA/hip OA   []Signs/symptoms consistent with internal derangement of knee/Hip   []Signs/symptoms consistent with functional hip weakness/NMR control      [x]Signs/symptoms consistent with tendinitis/tendinosis    []signs/symptoms consistent with pathology which may benefit from Dry needling      []other:      Prognosis/Rehab Potential:      []Excellent   [x]Good    []Fair   []Poor    Tolerance of evaluation/treatment:    []Excellent   [x]Good    []Fair   []Poor    Physical Therapy Evaluation Complexity Justification  [x] A history of present problem with:  [] no personal factors and/or comorbidities that impact the plan of care;  [x]1-2 personal factors and/or comorbidities that impact the plan of care  []3 personal factors and/or comorbidities that impact the plan of care  [x] An examination of body systems using standardized tests and measures addressing any of the following: body structures and functions (impairments), activity limitations, and/or participation restrictions;:  [x] a total of 1-2 or more elements   [] a total of 3 or more elements   [] a total of 4 or more elements   [x] A clinical presentation with:  [] stable and/or uncomplicated characteristics   [] evolving clinical presentation with changing characteristics  [] unstable and unpredictable characteristics;   [x] Clinical decision making of [x] Low, [] moderate, [] high complexity using standardized patient assessment instrument and/or measurable assessment of functional outcome. [x] EVAL (LOW) 96390 (typically 15 minutes face-to-face)  [] EVAL (MOD) 84250 (typically 30 minutes face-to-face)  [] EVAL (HIGH) 62917 (typically 45 minutes face-to-face)  [] RE-EVAL     PLAN:   Frequency/Duration:  2 days per week for 6 Weeks:  Interventions:  [x]  Therapeutic exercise including: strength training, ROM, for Lower extremity and core   [x]  NMR activation and proprioception for LE, Glutes and Core   [x]  Manual therapy as indicated for LE, Hip and spine to include: Dry Needling/IASTM, STM, PROM, Gr I-IV mobilizations, manipulation. [x] Modalities as needed that may include: thermal agents, E-stim, Biofeedback, US, iontophoresis as indicated  [x] Patient education on joint protection, postural re-education, activity modification, progression of HEP. HEP instruction: Written HEP instructions provided and reviewed. GOALS:  Patient stated goal: \"standing/walking for longer periods of time\"  [] Progressing: [] Met: [] Not Met: [] Adjusted    Therapist goals for Patient:   Short Term Goals: To be achieved in: 2 weeks  1. Independent in HEP and progression per patient tolerance, in order to prevent re-injury.    [] Progressing: [] Met: [] Not Met: [] Adjusted  2. Patient will have a decrease in pain to facilitate improvement in movement, function, and ADLs as indicated by Functional Deficits. [] Progressing: [] Met: [] Not Met: [] Adjusted    Long Term Goals: To be achieved in: 6 weeks  1. FOTO score of at least 65 to assist with reaching prior level of function. [] Progressing: [] Met: [] Not Met: [] Adjusted  2. Patient will demonstrate increased R ankle AROM to at least DF to 15 deg, PF to 70 deg, INV to 30 deg, and EV 15 deg to allow patient to demonstrate a more functional gait and return to running. [] Progressing: [] Met: [] Not Met: [] Adjusted  3. Patient will demonstrate an increase in R ankle strength to at least 4+/5  in all directions, and proximal strength to at least 4+/5 to allow patient to tolerate prolonged walking and standing, as well as returning to a running program.   [] Progressing: [] Met: [] Not Met: [] Adjusted  4. Patient will return to functional activities including working 8 hours shifts and recreational running without increased symptoms. [] Progressing: [] Met: [] Not Met: [] Adjusted      Electronically signed by:  Pauline Suero, PT, DPT  Therapist was present, directed the patient's care, made skilled judgement, and was responsible for assessment and treatment of the patient.  Jess Fields, SPT

## 2022-06-14 ENCOUNTER — HOSPITAL ENCOUNTER (OUTPATIENT)
Dept: PHYSICAL THERAPY | Age: 22
Setting detail: THERAPIES SERIES
Discharge: HOME | End: 2022-06-14
Payer: MEDICAID

## 2022-06-14 NOTE — FLOWSHEET NOTE
Physical Therapy  Cancellation/No-show Note  Patient Name:  Jona Villagomez  :  2000   Date:  2022  Cancelled visits to date: 0  No-shows to date: 1    Patient status for today's appointment patient:  []  Cancelled  []  Rescheduled appointment  [x]  No-show      Reason given by patient:  [x]  Patient ill (COVID 23)  []  Conflicting appointment  []  No transportation    []  Conflict with work  []  No reason given  []  Other:     Comments:      Phone call information:   [x]  Phone call made today to patient at 3:58pm time at number provided:      [x]  Patient answered, conversation as follows: John Vidales states that she has been diagnosed with COVID-19. Her symptoms started 2 days ago (2022). []  Patient did not answer, message left as follows:  []  Phone call not made today  []  Phone call not needed - pt contacted us to cancel and provided reason for cancellation.      Electronically signed by:  Anum Oneil PT Yes

## 2022-06-16 ENCOUNTER — HOSPITAL ENCOUNTER (OUTPATIENT)
Dept: PHYSICAL THERAPY | Age: 22
Setting detail: THERAPIES SERIES
Discharge: HOME | End: 2022-06-16
Payer: MEDICAID

## 2022-06-16 NOTE — FLOWSHEET NOTE
Physical Therapy  Cancellation/No-show Note  Patient Name:  Jona Villagomez  :  2000   Date:  2022  Cancelled visits to date: 1  No-shows to date: 1    Patient status for today's appointment patient:  [x]  Cancelled   []  Rescheduled appointment  []  No-show      Reason given by patient:  [x]  Patient ill (COVID 23)  []  Conflicting appointment  []  No transportation    []  Conflict with work  []  No reason given  []  Other:     Comments:      Phone call information:   [x]  Phone call made today to patient at 3:58pm time at number provided:      [x]  Patient answered, conversation as follows: Pt is still COVID positive. Requested pt have negative test before returning to next scheduled appt  at 1:30 p. Pt is agreeable. []  Patient did not answer, message left as follows:  []  Phone call not made today  []  Phone call not needed - pt contacted us to cancel and provided reason for cancellation.      Electronically signed by:  Kellen Pinedo, PT

## 2022-06-21 ENCOUNTER — HOSPITAL ENCOUNTER (OUTPATIENT)
Dept: PHYSICAL THERAPY | Age: 22
Setting detail: THERAPIES SERIES
Discharge: HOME | End: 2022-06-21
Payer: MEDICAID

## 2022-06-21 NOTE — FLOWSHEET NOTE
Physical Therapy  Cancellation/No-show Note  Patient Name:  Jona Villagomez  :  2000   Date:  2022  Cancelled visits to date: 1  No-shows to date: 2     Patient status for today's appointment patient:  []  Cancelled   []  Rescheduled appointment  [x]  No-show ,    Reason given by patient:  [x]  Patient ill (COVID 23)  []  Conflicting appointment  []  No transportation    []  Conflict with work  []  No reason given  []  Other:     Comments:      Phone call information:   [x]  Phone call made today to patient at 1:50pm time at number provided:      [x]  Patient answered, conversation as follows: Pt is COVID negative. Pt took a COVID test today, results came through too close to therapy appointment for her to attend. She states that she will be present for her appointment this Thursday at 11:15am.   []  Patient did not answer, message left as follows:  []  Phone call not made today  []  Phone call not needed - pt contacted us to cancel and provided reason for cancellation.      Electronically signed by:  Yossi Miles, PT, DPT, MS

## 2022-06-23 ENCOUNTER — HOSPITAL ENCOUNTER (OUTPATIENT)
Dept: PHYSICAL THERAPY | Age: 22
Setting detail: THERAPIES SERIES
Discharge: HOME | End: 2022-06-23
Payer: MEDICAID

## 2022-06-23 NOTE — FLOWSHEET NOTE
activities related to improving balance, coordination, kinesthetic sense, posture, motor skill, proprioception and motor activation to allow for proper function of core, proximal hip and LE with self care and ADLs  [] (16427) Gait Re-education- Provided training and instruction to the patient for proper LE, core and proximal hip recruitment and positioning and eccentric body weight control with ambulation re-education including up and down stairs     Home Exercise Program:    [x] (58070) Reviewed/Progressed HEP activities related to strengthening, flexibility, endurance, ROM of core, proximal hip and LE for functional self-care, mobility, lifting and ambulation/stair navigation   [] (82307)Reviewed/Progressed HEP activities related to improving balance, coordination, kinesthetic sense, posture, motor skill, proprioception of core, proximal hip and LE for self care, mobility, lifting, and ambulation/stair navigation      Manual Treatments:  PROM / STM / Oscillations-Mobs:  G-I, II, III, IV (PA's, Inf., Post.)  [] (59464) Provided manual therapy to mobilize LE, proximal hip and/or LS spine soft tissue/joints for the purpose of modulating pain, promoting relaxation,  increasing ROM, reducing/eliminating soft tissue swelling/inflammation/restriction, improving soft tissue extensibility and allowing for proper ROM for normal function with self care, mobility, lifting and ambulation. Modalities:  [] (95350) Vasopneumatic compression: Utilized vasopneumatic compression to decrease edema / swelling for the purpose of improving mobility and quad tone / recruitment which will allow for increased overall function including but not limited to self-care, transfers, ambulation, and ascending / descending stairs.        Charges:  Timed Code Treatment Minutes: 10   Total Treatment Minutes: 45     [] EVAL - LOW (79653)   [] EVAL - MOD (82772)  [] EVAL - HIGH (98224)  [] RE-EVAL (72227)  [x] EG(13423) x 1     [] Ionto  [] NMR (00815) x       [] Vaso  [] Manual (19868) x       [] Ultrasound  [] TA x        [] Mech Traction (60438)  [] Aquatic Therapy x      [] ES (un) (45749):   [] Home Management Training x  [] ES(attended) (71519)   [] Dry Needling 1-2 muscles (04492):  [] Dry Needling 3+ muscles (158354  [] Group:      [] Other:     GOALS:  Patient stated goal: \"standing/walking for longer periods of time\"  [] Progressing: [] Met: [] Not Met: [] Adjusted    Therapist goals for Patient:   Short Term Goals: To be achieved in: 2 weeks  1. Independent in HEP and progression per patient tolerance, in order to prevent re-injury. [] Progressing: [] Met: [] Not Met: [] Adjusted  2. Patient will have a decrease in pain to facilitate improvement in movement, function, and ADLs as indicated by Functional Deficits. [] Progressing: [] Met: [] Not Met: [] Adjusted    Long Term Goals: To be achieved in: 6 weeks  1. FOTO score of at least 65 to assist with reaching prior level of function. [] Progressing: [] Met: [] Not Met: [] Adjusted  2. Patient will demonstrate increased R ankle AROM to at least DF to 15 deg, PF to 70 deg, INV to 30 deg, and EV 15 deg to allow patient to demonstrate a more functional gait and return to running. [] Progressing: [] Met: [] Not Met: [] Adjusted  3. Patient will demonstrate an increase in R ankle strength to at least 4+/5  in all directions, and proximal strength to at least 4+/5 to allow patient to tolerate prolonged walking and standing, as well as returning to a running program.   [] Progressing: [] Met: [] Not Met: [] Adjusted  4. Patient will return to functional activities including working 8 hours shifts and recreational running without increased symptoms. [] Progressing: [] Met: [] Not Met: [] Adjusted    Overall Progression Towards Functional goals/ Treatment Progress Update:  [] Patient is progressing as expected towards functional goals listed.     [] Progression is slowed due to complexities/Impairments listed. [] Progression has been slowed due to co-morbidities. [x] Plan just implemented, too soon to assess goals progression <30days   [] Goals require adjustment due to lack of progress  [] Patient is not progressing as expected and requires additional follow up with physician  [] Other    Persisting Functional Limitations/Impairments:  []Sitting [x]Standing   [x]Walking [x]Stairs   []Transfers []ADLs   [x]Squatting/bending []Kneeling  []Housework [x]Job related tasks  []Driving []Sports/Recreation   []Sleeping []Other:    ASSESSMENT:  See eval  Treatment/Activity Tolerance:  [x] Pt able to complete treatment [] Patient limited by fatique  [] Patient limited by pain  [] Patient limited by other medical complications  [] Other:     Prognosis:  [x] Good [] Fair  [] Poor    Patient Requires Follow-up: [x] Yes  [] No    Return to Play:    [x]  N/A   []  Stage 1: Intro to Strength   []  Stage 2: Return to Run and Strength   []  Stage 3: Return to Jump and Strength   []  Stage 4: Dynamic Strength and Agility   []  Stage 5: Sport Specific Training     []  Ready to Return to Play, Meets All Above Stages   []  Not Ready for Return to Sports   Comments:            PLAN: See eval. PT 2x / week for 6 weeks. [x] Continue per plan of care [] Alter current plan (see comments)  [] Plan of care initiated [] Hold pending MD visit [] Discharge    Electronically signed by: Elizabeth Townsend, PT, DPT  Therapist was present, directed the patient's care, made skilled judgement, and was responsible for assessment and treatment of the patient. Fatemeh Mireles, SPT      Note: If patient does not return for scheduled/ recommended follow up visits, this note will serve as a discharge from care along with most recent update on progress.

## 2022-06-23 NOTE — FLOWSHEET NOTE
Physical Therapy  Cancellation/No-show Note  Patient Name:  Vashti Campbell  :  2000   Date:  2022  Cancelled visits to date: 1  No-shows to date: 2, 1 (pt no showed 11:15 am appt then RS to 3 pm)     Patient status for today's appointment patient:  []  Cancelled   []  Rescheduled appointment  [x]  No-show , ,    Reason given by patient:  []  Patient ill (COVID 19)  []  Conflicting appointment  []  No transportation    []  Conflict with work  [x]  No reason given  []  Other:     Comments:      Phone call information:   [x]  Phone call made today to patient at 11:45 am time at number provided:   [phone]    [x]  Patient answered, conversation as follows: Pt states she forgot about today's appt. She missed several appts due to being COVID positive. She is now COVID negative and would like to come in later today. Pt offered 3 pm appt time. Says she will be here this afternoon. []  Patient did not answer, message left as follows:  []  Phone call not made today  []  Phone call not needed - pt contacted us to cancel and provided reason for cancellation. Electronically signed by:  Cesar Orellana, PT, DPT  Therapist was present, directed the patient's care, made skilled judgement, and was responsible for assessment and treatment of the patient.  Jess Fields, SPT

## 2022-06-28 ENCOUNTER — APPOINTMENT (OUTPATIENT)
Dept: PHYSICAL THERAPY | Age: 22
End: 2022-06-28
Payer: MEDICAID

## 2022-06-30 ENCOUNTER — APPOINTMENT (OUTPATIENT)
Dept: PHYSICAL THERAPY | Age: 22
End: 2022-06-30
Payer: MEDICAID

## 2022-07-12 ENCOUNTER — HOSPITAL ENCOUNTER (OUTPATIENT)
Dept: PHYSICAL THERAPY | Age: 22
Setting detail: THERAPIES SERIES
Discharge: HOME | End: 2022-07-12

## 2022-07-12 NOTE — FLOWSHEET NOTE
Physical Therapy  Cancellation/No-show Note  Patient Name:  Medina Valle  :  2000   Date:  2022  Cancelled visits to date: 1  No-shows to date: 5     Patient status for today's appointment patient:  []  Cancelled   []  Rescheduled appointment  [x]  No-show , , ,   (pt no showed twice on  11:15 am appt then RS to 3 pm),    Reason given by patient:  []  Patient ill (COVID 19)  []  Conflicting appointment  []  No transportation    []  Conflict with work  [x]  No reason given  []  Other:     Comments:      Phone call information:   []  Phone call made today to patient at 11:45 am time at number provided:   [phone]    []  Patient answered, conversation as follows:    []  Patient did not answer, message left as follows:   [x]  Phone call not made today-Pt considered DC-ed this date per no show cancel policy. Please see eval for most recent objective measures. []  Phone call not needed - pt contacted us to cancel and provided reason for cancellation.      Electronically signed by:  Zohreh Bose, PT, DPT

## 2022-07-14 ENCOUNTER — OFFICE VISIT (OUTPATIENT)
Dept: PRIMARY CARE CLINIC | Age: 22
End: 2022-07-14
Payer: MEDICAID

## 2022-07-14 VITALS
WEIGHT: 144 LBS | TEMPERATURE: 97 F | HEIGHT: 62 IN | SYSTOLIC BLOOD PRESSURE: 102 MMHG | DIASTOLIC BLOOD PRESSURE: 64 MMHG | BODY MASS INDEX: 26.5 KG/M2 | HEART RATE: 80 BPM

## 2022-07-14 DIAGNOSIS — S76.212A INGUINAL STRAIN, LEFT, INITIAL ENCOUNTER: Primary | ICD-10-CM

## 2022-07-14 PROCEDURE — 99213 OFFICE O/P EST LOW 20 MIN: CPT | Performed by: STUDENT IN AN ORGANIZED HEALTH CARE EDUCATION/TRAINING PROGRAM

## 2022-07-14 RX ORDER — NAPROXEN 500 MG/1
500 TABLET ORAL 2 TIMES DAILY WITH MEALS
Qty: 28 TABLET | Refills: 0 | Status: SHIPPED | OUTPATIENT
Start: 2022-07-14 | End: 2022-09-28

## 2022-07-14 ASSESSMENT — ENCOUNTER SYMPTOMS
DIARRHEA: 0
BACK PAIN: 0
ABDOMINAL PAIN: 0
CONSTIPATION: 0

## 2022-07-14 NOTE — PROGRESS NOTES
[facility] Primary Care  2022    Fatimah Linton (:  2000) is a 25 y.o. female, here for evaluation of the following medical concerns:    Chief Complaint   Patient presents with    Other     mass on left inner thigh hard ball with fluid         ASSESSMENT/ PLAN  1. Inguinal strain, left, initial encounter  Uncontrolled, this is a new problem. Could be related to hernia which has already been reduced. Exam is benign aside from tenderness in the area. Differential also includes referred back or hip pain. Initiate course of naproxen, follow-up if persistent or worsening. Home physical therapy exercises provided  - naproxen (NAPROSYN) 500 MG tablet; Take 1 tablet by mouth 2 times daily (with meals)  Dispense: 28 tablet; Refill: 0       Return if symptoms worsen or fail to improve. HPI  Patient presents today with concerns of left groin pain. Notes that it began about a week ago, insidious onset. Is associated with pain in her low back and hip. No known injury. No skin changes. She states that she could palpate a mass, which has resolved. She has not tried any medications for this problem. No abdominal or urinary complaints. No history of hernia. ROS  Review of Systems   Constitutional: Negative for activity change, fever and unexpected weight change. Eyes: Negative for visual disturbance. Cardiovascular: Negative for leg swelling. Gastrointestinal: Negative for abdominal pain, constipation and diarrhea. Genitourinary: Negative for difficulty urinating and dysuria. Musculoskeletal: Negative for back pain, gait problem, neck pain and neck stiffness. Neurological: Negative for weakness, numbness and headaches.        HISTORIES  Current Outpatient Medications on File Prior to Visit   Medication Sig Dispense Refill    valACYclovir (VALTREX) 500 MG tablet Take 1 tablet by mouth daily 30 tablet 11    cariprazine hcl (VRAYLAR) 3 MG CAPS capsule Take 1 capsule by mouth daily 30 capsule 2    traZODone (DESYREL) 50 MG tablet Take 1 tablet by mouth nightly as needed for Sleep 30 tablet 5     No current facility-administered medications on file prior to visit. Past Medical History:   Diagnosis Date    Bipolar 1 disorder ([facility]      Patient Active Problem List   Diagnosis    OWEN (generalized anxiety disorder)    Primary insomnia    Bipolar 1 disorder ([facility]    Current smoker       PE  Vitals:    07/14/22 1438   BP: 102/64   Site: Left Upper Arm   Position: Sitting   Cuff Size: Medium Adult   Pulse: 80   Temp: 97 °F (36.1 °C)   TempSrc: Temporal   Weight: 144 lb (65.3 kg)   Height: 5' 2\" (1.575 m)     Estimated body mass index is 26.34 kg/m² as calculated from the following:    Height as of this encounter: 5' 2\" (1.575 m). Weight as of this encounter: 144 lb (65.3 kg). Physical Exam  Vitals reviewed. Constitutional:       General: She is not in acute distress. Appearance: Normal appearance. HENT:      Head: Normocephalic and atraumatic. Musculoskeletal:         General: Tenderness present. No deformity or signs of injury. Normal range of motion. Cervical back: Normal range of motion. Right lower leg: No edema. Left lower leg: No edema. Comments: Tenderness to palpation left groin, exam negative for hernia   Skin:     General: Skin is warm and dry. Capillary Refill: Capillary refill takes less than 2 seconds. Neurological:      General: No focal deficit present. Mental Status: She is alert. Sensory: No sensory deficit. Motor: No weakness. Gait: Gait normal.   Psychiatric:         Mood and Affect: Mood normal.         Behavior: Behavior normal.         Dipika Christopher DO    This dictation was generated by voice recognition computer software. Although all attempts are made to edit the dictation for accuracy, there may be errors in the transcription that are not intended.

## 2022-07-14 NOTE — PATIENT INSTRUCTIONS
Patient Education        Hip Flexor Strain: Rehab Exercises  Introduction  Here are some examples of exercises for you to try. The exercises may be suggested for a condition or for rehabilitation. Start each exercise slowly. Ease off the exercises if you start to have pain. You will be told when to start these exercises and which ones will work bestfor you. How to do the exercises  Pelvic tilt with marching    1. Lie on your back with your knees bent and your feet flat on the floor. 2. Tighten your belly muscles and buttocks, and press your lower back to the floor. 3. Keeping your knees bent, lift and then lower one leg up off the floor, and then lift and lower your other leg like you are marching. Each time you lift your leg, hold that position for about 6 seconds before lowering your leg. 4. Repeat 8 to 12 times. Scissors    1. Lie on your back with your knees bent at a 90-degree angle and your feet off the floor. 2. Tighten your belly muscles and buttocks, and press your lower back to the floor. 3. Slowly straighten one leg, and hold that position for about 6 seconds. Your leg should be about 12 inches off the floor. Bring that leg back to the starting position, and then straighten your other leg. Hold that position for about 6 seconds, and then switch legs again. 4. Repeat 8 to 12 times. Hamstring stretch (lying down)    1. Lie flat on your back with your legs straight. If you feel discomfort in your back, place a small towel roll under your lower back. 2. Holding the back of your affected leg for support, lift your leg straight up and toward your body until you feel a stretch at the back of your thigh. 3. Hold the stretch for at least 30 seconds. 4. Repeat 2 to 4 times. Quadricep and hip flexor stretch (lying on side)    1. Lie on your side with your good leg flat on the floor and your hand supporting your head.   2. Bend your top leg, and reach behind you to grab the front of that foot or ankle with your other hand. 3. Stretch your leg back by pulling your foot toward your buttock. You will feel the stretch in the front of your thigh. If this causes stress on your knee, do not do this stretch. 4. Hold the stretch for at least 15 to 30 seconds. 5. Repeat 2 to 4 times. Hip flexor stretch (kneeling)    1. Kneel on your affected leg and bend your good leg out in front of you, with that foot flat on the floor. If you feel discomfort in the front of your knee, place a towel under your knee. 2. Keeping your back straight, slowly push your hips forward until you feel a stretch in the upper thigh of your back leg and hip. 3. Hold the stretch for at least 15 to 30 seconds. 4. Repeat 2 to 4 times. Hip flexor stretch (edge of table)    1. Lie flat on your back on a table or flat bench, with your knees and lower legs hanging off the edge of the table. 2. Grab your good leg at the knee, and pull that knee back toward your chest. Relax your affected leg and let it hang down toward the floor until you feel a stretch in the upper thigh of your affected leg and hip. 3. Hold the stretch for at least 15 to 30 seconds. 4. Repeat 2 to 4 times. Follow-up care is a key part of your treatment and safety. Be sure to make and go to all appointments, and call your doctor if you are having problems. It's also a good idea to know your test results and keep alist of the medicines you take. Where can you learn more? Go to https://[employer]. org and sign in to your [employer] account. Enter L398 in the [employer] box to learn more about \"Hip Flexor Strain: Rehab Exercises. \"     If you do not have an account, please click on the \"Sign Up Now\" link. Current as of: March 9, 2022               Content Version: 13.3  © 2006-2022 Healthwise, Inc[employer]. Care instructions adapted under license by [facility] ([facility]).  If you have questions about a medical condition or this instruction, always ask your healthcare professional. [address] any warranty or liability for your use of this information.
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
## 2022-07-19 ENCOUNTER — OFFICE VISIT (OUTPATIENT)
Dept: PSYCHIATRY | Age: 22
End: 2022-07-19
Payer: MEDICAID

## 2022-07-19 VITALS
HEART RATE: 79 BPM | BODY MASS INDEX: 26.08 KG/M2 | WEIGHT: 142.6 LBS | SYSTOLIC BLOOD PRESSURE: 97 MMHG | DIASTOLIC BLOOD PRESSURE: 66 MMHG

## 2022-07-19 DIAGNOSIS — F60.3 BORDERLINE PERSONALITY DISORDER (HCC): ICD-10-CM

## 2022-07-19 DIAGNOSIS — F41.0 GENERALIZED ANXIETY DISORDER WITH PANIC ATTACKS: Primary | ICD-10-CM

## 2022-07-19 DIAGNOSIS — F41.1 GENERALIZED ANXIETY DISORDER WITH PANIC ATTACKS: Primary | ICD-10-CM

## 2022-07-19 DIAGNOSIS — F43.12 CHRONIC POST-TRAUMATIC STRESS DISORDER: ICD-10-CM

## 2022-07-19 DIAGNOSIS — F33.1 MODERATE EPISODE OF RECURRENT MAJOR DEPRESSIVE DISORDER (HCC): ICD-10-CM

## 2022-07-19 DIAGNOSIS — F40.10 SOCIAL ANXIETY DISORDER: ICD-10-CM

## 2022-07-19 PROCEDURE — 90833 PSYTX W PT W E/M 30 MIN: CPT | Performed by: STUDENT IN AN ORGANIZED HEALTH CARE EDUCATION/TRAINING PROGRAM

## 2022-07-19 PROCEDURE — 99204 OFFICE O/P NEW MOD 45 MIN: CPT | Performed by: STUDENT IN AN ORGANIZED HEALTH CARE EDUCATION/TRAINING PROGRAM

## 2022-07-19 ASSESSMENT — ANXIETY QUESTIONNAIRES
6. BECOMING EASILY ANNOYED OR IRRITABLE: 3
IF YOU CHECKED OFF ANY PROBLEMS ON THIS QUESTIONNAIRE, HOW DIFFICULT HAVE THESE PROBLEMS MADE IT FOR YOU TO DO YOUR WORK, TAKE CARE OF THINGS AT HOME, OR GET ALONG WITH OTHER PEOPLE: EXTREMELY DIFFICULT
GAD7 TOTAL SCORE: 14
5. BEING SO RESTLESS THAT IT IS HARD TO SIT STILL: 2
1. FEELING NERVOUS, ANXIOUS, OR ON EDGE: 2
3. WORRYING TOO MUCH ABOUT DIFFERENT THINGS: 2
4. TROUBLE RELAXING: 2
2. NOT BEING ABLE TO STOP OR CONTROL WORRYING: 2
7. FEELING AFRAID AS IF SOMETHING AWFUL MIGHT HAPPEN: 1

## 2022-07-19 ASSESSMENT — PATIENT HEALTH QUESTIONNAIRE - PHQ9
8. MOVING OR SPEAKING SO SLOWLY THAT OTHER PEOPLE COULD HAVE NOTICED. OR THE OPPOSITE, BEING SO FIGETY OR RESTLESS THAT YOU HAVE BEEN MOVING AROUND A LOT MORE THAN USUAL: 1
SUM OF ALL RESPONSES TO PHQ9 QUESTIONS 1 & 2: 4
SUM OF ALL RESPONSES TO PHQ QUESTIONS 1-9: 17
4. FEELING TIRED OR HAVING LITTLE ENERGY: 3
SUM OF ALL RESPONSES TO PHQ QUESTIONS 1-9: 17
6. FEELING BAD ABOUT YOURSELF - OR THAT YOU ARE A FAILURE OR HAVE LET YOURSELF OR YOUR FAMILY DOWN: 1
7. TROUBLE CONCENTRATING ON THINGS, SUCH AS READING THE NEWSPAPER OR WATCHING TELEVISION: 2
SUM OF ALL RESPONSES TO PHQ QUESTIONS 1-9: 17
2. FEELING DOWN, DEPRESSED OR HOPELESS: 2
SUM OF ALL RESPONSES TO PHQ QUESTIONS 1-9: 17
3. TROUBLE FALLING OR STAYING ASLEEP: 3
5. POOR APPETITE OR OVEREATING: 3
1. LITTLE INTEREST OR PLEASURE IN DOING THINGS: 2
10. IF YOU CHECKED OFF ANY PROBLEMS, HOW DIFFICULT HAVE THESE PROBLEMS MADE IT FOR YOU TO DO YOUR WORK, TAKE CARE OF THINGS AT HOME, OR GET ALONG WITH OTHER PEOPLE: 2
9. THOUGHTS THAT YOU WOULD BE BETTER OFF DEAD, OR OF HURTING YOURSELF: 0

## 2022-07-19 ASSESSMENT — ENCOUNTER SYMPTOMS
GASTROINTESTINAL NEGATIVE: 1
EYES NEGATIVE: 1
RESPIRATORY NEGATIVE: 1
ALLERGIC/IMMUNOLOGIC NEGATIVE: 1

## 2022-08-23 ENCOUNTER — OFFICE VISIT (OUTPATIENT)
Dept: PSYCHIATRY | Age: 22
End: 2022-08-23
Payer: MEDICAID

## 2022-08-23 VITALS — SYSTOLIC BLOOD PRESSURE: 100 MMHG | DIASTOLIC BLOOD PRESSURE: 70 MMHG | BODY MASS INDEX: 25.79 KG/M2 | WEIGHT: 141 LBS

## 2022-08-23 DIAGNOSIS — F60.3 BORDERLINE PERSONALITY DISORDER (HCC): ICD-10-CM

## 2022-08-23 DIAGNOSIS — F40.10 SOCIAL ANXIETY DISORDER: ICD-10-CM

## 2022-08-23 DIAGNOSIS — F41.0 GENERALIZED ANXIETY DISORDER WITH PANIC ATTACKS: Primary | ICD-10-CM

## 2022-08-23 DIAGNOSIS — F43.12 CHRONIC POST-TRAUMATIC STRESS DISORDER: ICD-10-CM

## 2022-08-23 DIAGNOSIS — F41.1 GENERALIZED ANXIETY DISORDER WITH PANIC ATTACKS: Primary | ICD-10-CM

## 2022-08-23 DIAGNOSIS — F33.1 MODERATE EPISODE OF RECURRENT MAJOR DEPRESSIVE DISORDER (HCC): ICD-10-CM

## 2022-08-23 PROCEDURE — 99214 OFFICE O/P EST MOD 30 MIN: CPT | Performed by: STUDENT IN AN ORGANIZED HEALTH CARE EDUCATION/TRAINING PROGRAM

## 2022-08-23 RX ORDER — DULOXETIN HYDROCHLORIDE 30 MG/1
30 CAPSULE, DELAYED RELEASE ORAL DAILY
Qty: 30 CAPSULE | Refills: 0 | Status: SHIPPED | OUTPATIENT
Start: 2022-08-23 | End: 2022-09-20

## 2022-08-23 ASSESSMENT — ANXIETY QUESTIONNAIRES
3. WORRYING TOO MUCH ABOUT DIFFERENT THINGS: 2
6. BECOMING EASILY ANNOYED OR IRRITABLE: 3
1. FEELING NERVOUS, ANXIOUS, OR ON EDGE: 3
5. BEING SO RESTLESS THAT IT IS HARD TO SIT STILL: 2
GAD7 TOTAL SCORE: 17
2. NOT BEING ABLE TO STOP OR CONTROL WORRYING: 2
4. TROUBLE RELAXING: 3
7. FEELING AFRAID AS IF SOMETHING AWFUL MIGHT HAPPEN: 2

## 2022-08-23 ASSESSMENT — ENCOUNTER SYMPTOMS
ALLERGIC/IMMUNOLOGIC NEGATIVE: 1
EYES NEGATIVE: 1
NAUSEA: 1
RESPIRATORY NEGATIVE: 1

## 2022-08-23 ASSESSMENT — PATIENT HEALTH QUESTIONNAIRE - PHQ9
6. FEELING BAD ABOUT YOURSELF - OR THAT YOU ARE A FAILURE OR HAVE LET YOURSELF OR YOUR FAMILY DOWN: 1
SUM OF ALL RESPONSES TO PHQ9 QUESTIONS 1 & 2: 6
1. LITTLE INTEREST OR PLEASURE IN DOING THINGS: 3
4. FEELING TIRED OR HAVING LITTLE ENERGY: 3
3. TROUBLE FALLING OR STAYING ASLEEP: 3
SUM OF ALL RESPONSES TO PHQ QUESTIONS 1-9: 20
9. THOUGHTS THAT YOU WOULD BE BETTER OFF DEAD, OR OF HURTING YOURSELF: 1
10. IF YOU CHECKED OFF ANY PROBLEMS, HOW DIFFICULT HAVE THESE PROBLEMS MADE IT FOR YOU TO DO YOUR WORK, TAKE CARE OF THINGS AT HOME, OR GET ALONG WITH OTHER PEOPLE: 3
SUM OF ALL RESPONSES TO PHQ QUESTIONS 1-9: 20
7. TROUBLE CONCENTRATING ON THINGS, SUCH AS READING THE NEWSPAPER OR WATCHING TELEVISION: 3
SUM OF ALL RESPONSES TO PHQ QUESTIONS 1-9: 19
2. FEELING DOWN, DEPRESSED OR HOPELESS: 3
SUM OF ALL RESPONSES TO PHQ QUESTIONS 1-9: 20
5. POOR APPETITE OR OVEREATING: 2
8. MOVING OR SPEAKING SO SLOWLY THAT OTHER PEOPLE COULD HAVE NOTICED. OR THE OPPOSITE, BEING SO FIGETY OR RESTLESS THAT YOU HAVE BEEN MOVING AROUND A LOT MORE THAN USUAL: 1

## 2022-08-23 NOTE — PROGRESS NOTES
depressive disorder ([facility] [address]    Borderline personality disorder ([facility]    Social anxiety disorder        Brief Psych Hx:  Hosp: 2x in 2015 after suicide attempts as well as a 6mo stay in a treatment facility as a teenager  Diagnoses: Bipolar disorder, depression, anxiety  Med trials: fluoxetine, lithium, sertraline (suicidal), cariprazine  Outpt: Previous psychiatrists since age 15, has a history of therapy/counseling since that time as well  NSSI: History of cutting, last done about 1mo ago. Tops of thighs and on her arms  Suicide Attempts: 2x in 2015 when she attempted to overdose on Trazodone    O:  Wt Readings from Last 3 Encounters:   08/23/22 141 lb (64 kg)   07/19/22 142 lb 9.6 oz (64.7 kg)   07/14/22 144 lb (65.3 kg)       Vitals:    08/23/22 1544   BP: 100/70   Weight: 141 lb (64 kg)       Mental Status Exam:   Appearance:    Appropriately dressed  Motor: No abnormal movements, tics or mannerisms.   Eye Contact: Fair to good  Speech:    Appropriate rate and rhythm  Language:   Appropriate diction  Mood/Affect:  \" Still having problems\"/some blunting of affect apparent  Thought Process:   Generally linear, logical  Thought Content:    Depressive and anxious content present, no SI/HI  Hallucinations:   Denied, not seem to be responding to internal stimuli  Associations:   Intact  Attention/Concentration:   Intact  Orientation:    Alert and oriented x4  Memory:   Intact  Fund of Knowledge:    Appropriate for age and education  Insight/Judgement:   Intact/intact      OWEN-7 SCREENING 8/23/2022 7/19/2022   Feeling nervous, anxious, or on edge Nearly every day More than half the days   Not being able to stop or control worrying More than half the days More than half the days   Worrying too much about different things More than half the days More than half the days   Trouble relaxing Nearly every day More than half the days   Being so restless that it is hard to sit still More than half the days More than half the days Becoming easily annoyed or irritable Nearly every day Nearly every day   Feeling afraid as if something awful might happen More than half the days Several days   OWEN-7 Total Score 17 14   How difficult have these problems made it for you to do your work, take care of things at home, or get along with other people? - Extremely difficult   Feeling nervous, anxious, or on edge - -   Not able to stop or control worrying - -   Worrying too much about different things - -   Trouble relaxing - -   Being so restless that it's hard to sit still - -   Becoming easily annoyed or irritable - -   Feeling afraid as if something awful might happen - -   OWEN-7 Total Score - -     PHQ-9 Questionaire 8/23/2022 7/19/2022   Little interest or pleasure in doing things 3 2   Feeling down, depressed, or hopeless 3 2   Trouble falling or staying asleep, or sleeping too much 3 3   Feeling tired or having little energy 3 3   Poor appetite or overeating 2 3   Feeling bad about yourself - or that you are a failure or have let yourself or your family down 1 1   Trouble concentrating on things, such as reading the newspaper or watching television 3 2   Moving or speaking so slowly that other people could have noticed. Or the opposite - being so fidgety or restless that you have been moving around a lot more than usual 1 1   Thoughts that you would be better off dead, or of hurting yourself in some way 1 0   PHQ-9 Total Score 20 17   If you checked off any problems, how difficult have these problems made it for you to do your work, take care of things at home, or get along with other people? 3 2      Labs:     No results found for any previous visit.        EKG: None on file        Cl Steven MD  Psychiatrist

## 2022-09-01 ENCOUNTER — TELEPHONE (OUTPATIENT)
Dept: PRIMARY CARE CLINIC | Age: 22
End: 2022-09-01

## 2022-09-01 NOTE — TELEPHONE ENCOUNTER
Spoke with patients she stated she's having bloody stool and lower right abdomen pain. Stated it started a few days after starting medication. Patient is still taking medication daily.

## 2022-09-01 NOTE — TELEPHONE ENCOUNTER
Patient called in requesting a call from Ren Martinez concerning side effects she's having with her medicine DULoxetine.

## 2022-09-02 NOTE — TELEPHONE ENCOUNTER
Please call patient and let her know that I do not think this is related to her psychiatric medication. I am concerned it could be caused by inflammatory bowel disease. I would like her to set up an appointment with Dr. Mahamed Ortega early next week. We will likely refer her to a gastroenterologist depending upon our findings at that time.

## 2022-09-06 ENCOUNTER — HOSPITAL ENCOUNTER (EMERGENCY)
Age: 22
Discharge: HOME | End: 2022-09-06
Attending: EMERGENCY MEDICINE
Payer: MEDICAID

## 2022-09-06 VITALS
OXYGEN SATURATION: 96 % | TEMPERATURE: 99.3 F | SYSTOLIC BLOOD PRESSURE: 116 MMHG | HEART RATE: 92 BPM | WEIGHT: 139.33 LBS | BODY MASS INDEX: 25.48 KG/M2 | DIASTOLIC BLOOD PRESSURE: 78 MMHG | RESPIRATION RATE: 18 BRPM

## 2022-09-06 DIAGNOSIS — K62.5 RECTAL BLEEDING: ICD-10-CM

## 2022-09-06 DIAGNOSIS — F17.200 SMOKER: ICD-10-CM

## 2022-09-06 DIAGNOSIS — R10.30 LOWER ABDOMINAL PAIN: ICD-10-CM

## 2022-09-06 DIAGNOSIS — K64.8 INTERNAL HEMORRHOIDS: Primary | ICD-10-CM

## 2022-09-06 LAB
A/G RATIO: 1.4 (ref 1.1–2.2)
ALBUMIN SERPL-MCNC: 4.5 G/DL (ref 3.4–5)
ALP BLD-CCNC: 70 U/L (ref 40–129)
ALT SERPL-CCNC: 14 U/L (ref 10–40)
ANION GAP SERPL CALCULATED.3IONS-SCNC: 13 MMOL/L (ref 3–16)
APTT: 32.2 SEC (ref 23–34.3)
AST SERPL-CCNC: 16 U/L (ref 15–37)
BACTERIA: ABNORMAL /HPF
BASOPHILS ABSOLUTE: 0 K/UL (ref 0–0.2)
BASOPHILS RELATIVE PERCENT: 0.5 %
BILIRUB SERPL-MCNC: 0.6 MG/DL (ref 0–1)
BILIRUBIN URINE: NEGATIVE
BLOOD, URINE: NEGATIVE
BUN BLDV-MCNC: 11 MG/DL (ref 7–20)
CALCIUM SERPL-MCNC: 9.9 MG/DL (ref 8.3–10.6)
CHLORIDE BLD-SCNC: 102 MMOL/L (ref 99–110)
CLARITY: ABNORMAL
CO2: 24 MMOL/L (ref 21–32)
COLOR: ABNORMAL
CREAT SERPL-MCNC: 0.6 MG/DL (ref 0.6–1.1)
EOSINOPHILS ABSOLUTE: 0 K/UL (ref 0–0.6)
EOSINOPHILS RELATIVE PERCENT: 0.7 %
EPITHELIAL CELLS, UA: 14 /HPF (ref 0–5)
GFR AFRICAN AMERICAN: >60
GFR NON-AFRICAN AMERICAN: >60
GLUCOSE BLD-MCNC: 116 MG/DL (ref 70–99)
GLUCOSE URINE: NEGATIVE MG/DL
HCG QUALITATIVE: NEGATIVE
HCT VFR BLD CALC: 43.1 % (ref 36–48)
HEMOGLOBIN: 14.8 G/DL (ref 12–16)
HYALINE CASTS: 2 /LPF (ref 0–8)
INR BLD: 1.02 (ref 0.87–1.14)
KETONES, URINE: ABNORMAL MG/DL
LEUKOCYTE ESTERASE, URINE: ABNORMAL
LIPASE: 18 U/L (ref 13–60)
LYMPHOCYTES ABSOLUTE: 1.6 K/UL (ref 1–5.1)
LYMPHOCYTES RELATIVE PERCENT: 24.9 %
MCH RBC QN AUTO: 31.1 PG (ref 26–34)
MCHC RBC AUTO-ENTMCNC: 34.4 G/DL (ref 31–36)
MCV RBC AUTO: 90.2 FL (ref 80–100)
MICROSCOPIC EXAMINATION: YES
MONOCYTES ABSOLUTE: 0.5 K/UL (ref 0–1.3)
MONOCYTES RELATIVE PERCENT: 8.6 %
NEUTROPHILS ABSOLUTE: 4.1 K/UL (ref 1.7–7.7)
NEUTROPHILS RELATIVE PERCENT: 65.3 %
NITRITE, URINE: NEGATIVE
PDW BLD-RTO: 12.5 % (ref 12.4–15.4)
PH UA: 5 (ref 5–8)
PLATELET # BLD: 188 K/UL (ref 135–450)
PMV BLD AUTO: 9.1 FL (ref 5–10.5)
POTASSIUM REFLEX MAGNESIUM: 3.6 MMOL/L (ref 3.5–5.1)
PROTEIN UA: ABNORMAL MG/DL
PROTHROMBIN TIME: 13.3 SEC (ref 11.7–14.5)
RBC # BLD: 4.78 M/UL (ref 4–5.2)
RBC UA: 1 /HPF (ref 0–4)
SODIUM BLD-SCNC: 139 MMOL/L (ref 136–145)
SPECIFIC GRAVITY UA: 1.03 (ref 1–1.03)
TOTAL PROTEIN: 7.7 G/DL (ref 6.4–8.2)
URINE REFLEX TO CULTURE: ABNORMAL
URINE TYPE: ABNORMAL
UROBILINOGEN, URINE: 1 E.U./DL
WBC # BLD: 6.3 K/UL (ref 4–11)
WBC UA: 4 /HPF (ref 0–5)

## 2022-09-06 PROCEDURE — 81001 URINALYSIS AUTO W/SCOPE: CPT

## 2022-09-06 PROCEDURE — 84703 CHORIONIC GONADOTROPIN ASSAY: CPT

## 2022-09-06 PROCEDURE — 83690 ASSAY OF LIPASE: CPT

## 2022-09-06 PROCEDURE — 80053 COMPREHEN METABOLIC PANEL: CPT

## 2022-09-06 PROCEDURE — 85610 PROTHROMBIN TIME: CPT

## 2022-09-06 PROCEDURE — 99283 EMERGENCY DEPT VISIT LOW MDM: CPT

## 2022-09-06 PROCEDURE — 85730 THROMBOPLASTIN TIME PARTIAL: CPT

## 2022-09-06 PROCEDURE — 85025 COMPLETE CBC W/AUTO DIFF WBC: CPT

## 2022-09-06 ASSESSMENT — ENCOUNTER SYMPTOMS
BLOOD IN STOOL: 1
DIARRHEA: 1
COUGH: 0
NAUSEA: 0
EYE PAIN: 0
SHORTNESS OF BREATH: 0
VOMITING: 0
RHINORRHEA: 0
ABDOMINAL PAIN: 1
BACK PAIN: 0
CONSTIPATION: 0
SORE THROAT: 0

## 2022-09-06 ASSESSMENT — PAIN DESCRIPTION - LOCATION: LOCATION: ABDOMEN

## 2022-09-06 ASSESSMENT — PAIN - FUNCTIONAL ASSESSMENT: PAIN_FUNCTIONAL_ASSESSMENT: 0-10

## 2022-09-06 ASSESSMENT — PAIN SCALES - GENERAL: PAINLEVEL_OUTOF10: 3

## 2022-09-06 NOTE — DISCHARGE INSTRUCTIONS
Please follow up with your PCP and the colorectal surgeon as discussed.    Please return to ED for any worsening symptoms

## 2022-09-06 NOTE — ED PROVIDER NOTES
I have personally performed a face to face diagnostic evaluation on this patient. I have fully participated in the care of this patient I personally saw the patient and performed a substantive portion of the visit including all aspects of the medical decision making. I have reviewed and agree with all pertinent clinical information including history, physical exam, diagnostic tests, and the plan. HPI: Neha Catherine presented with rectal bleeding few days ago. Bright red blood on toilet paper and in stool. This morning around 3 AM had a large amount of blood in her stool. No pain. No other associated symptoms recently started on new medication. Never had this before. See ALFREDA note for further details. Chief Complaint   Patient presents with    Rectal Bleeding     Pt started on duloxetine about 2 weeks ago. Started noticing blood in stool a few days ago, then this morning around 0300 pt noticed large amount of blood in stool. Review of Systems: See ALFREDA note  Vital Signs: /80   Pulse (!) 112 Comment: pt states \"hospitals make me nervous:\"  Temp 99.3 °F (37.4 °C) (Oral)   Resp 18   Wt 139 lb 5.3 oz (63.2 kg)   SpO2 96%   BMI 25.48 kg/m²     Alert 25 y.o. female who does not appear toxic or acutely ill  HENT: Atraumatic, oral mucosa moist  Neck: Grossly normal ROM  Chest/Lungs: respiratory effort normal   Abdomen: Soft nontender,  exam as documented in ALFREDA note  Musculoskeletal: Grossly normal ROM  Skin: No palor or diaphoresis    Medical Decision Making and Plan:  Pertinent Labs & Imaging studies reviewed. (See ALFREDA chart for details)  I agree with ALFREDA assessment and plan. Labs unremarkable no signs of abnormal hemoglobin. On rectal exam per ALFREDA patient has internal hemorrhoid. Will treat symptomatically and discharged with strict return precautions for any new or worsening symptoms as well as instructions follow-up with primary care.      Booker Lefort, MD  09/06/22 1955

## 2022-09-06 NOTE — ED PROVIDER NOTES
[address] [facility]        Pt Name: Brannon Franco  MRN: [id number]  [place] 2000  Date of evaluation: 9/6/2022  Provider: VLAD Bass - CNP  PCP: Ledy Weber DO  Note Started: 2:14 PM EDT       I have seen and evaluated this patient with my supervising physician Dr Leoncio Catherine   Patient presents with    Rectal Bleeding     Pt started on duloxetine about 2 weeks ago. Started noticing blood in stool a few days ago, then this morning around 0300 pt noticed large amount of blood in stool. HISTORY OF PRESENT ILLNESS   (Location/Symptom, Timing/Onset, Context/Setting, Quality, Duration, Modifying Factors, Severity)  Note limiting factors. Chief Complaint: Above    Brannon Franco is a 25 y.o. female who presents ED for evaluation for lower abdominal pain ongoing for the past 2 weeks associate with diarrhea, and bright red blood per rectum starting today with blood dripping out the rectum today. Reports symptoms have been getting slightly better over the course of the last week. Last period roughly 1 month ago. Unsure exactly when. Recently started Cymbalta. Denies abdominal trauma. Denies any chance of pregnancy. Denies any concerns for STDs    Nursing Notes were all reviewed and agreed with or any disagreements were addressed in the HPI. REVIEW OF SYSTEMS    (2-9 systems for level 4, 10 or more for level 5)     Review of Systems   Constitutional:  Positive for activity change. Negative for chills, diaphoresis and fever. HENT:  Negative for congestion, rhinorrhea and sore throat. Eyes:  Negative for pain and visual disturbance. Respiratory:  Negative for cough and shortness of breath. Cardiovascular:  Negative for chest pain and leg swelling. Gastrointestinal:  Positive for abdominal pain, blood in stool and diarrhea.  Negative for constipation, nausea and vomiting. Genitourinary:  Negative for dysuria, frequency and hematuria. Musculoskeletal:  Negative for back pain and neck pain. Skin:  Negative for rash and wound. Neurological:  Negative for dizziness and light-headedness.      PAST MEDICAL HISTORY     Past Medical History:   Diagnosis Date    Bipolar 1 disorder ([facility] [address]        SURGICAL HISTORY     Past Surgical History:   Procedure Laterality Date     SECTION      TUBAL LIGATION         CURRENTMEDICATIONS       Discharge Medication List as of 2022  3:44 PM        CONTINUE these medications which have NOT CHANGED    Details   DULoxetine (CYMBALTA) 30 MG extended release capsule Take 1 capsule by mouth daily, Disp-30 capsule, R-0Normal      naproxen (NAPROSYN) 500 MG tablet Take 1 tablet by mouth 2 times daily (with meals), Disp-28 tablet, R-0Normal      valACYclovir (VALTREX) 500 MG tablet Take 1 tablet by mouth daily, Disp-30 tablet, R-11Normal      traZODone (DESYREL) 50 MG tablet Take 1 tablet by mouth nightly as needed for Sleep, Disp-30 tablet, R-5Normal             ALLERGIES     Latex and Prozac [fluoxetine]    FAMILYHISTORY       Family History   Problem Relation Age of Onset    Depression Mother     Anxiety Disorder Mother     Alcohol Abuse Mother     Schizophrenia Father     Bipolar Disorder Father     Suicide Attempts Father     Suicide Attempts Paternal Uncle         SOCIAL HISTORY       Social History     Socioeconomic History    Marital status:      Spouse name: elena kitchen    Number of children: 2    Highest education level: Some college, no degree   Occupational History     Comment: [employer]   Tobacco Use    Smoking status: Every Day     Packs/day: 0.25     Types: Cigarettes    Smokeless tobacco: Never   Vaping Use    Vaping Use: Every day    Substances: Nicotine   Substance and Sexual Activity    Alcohol use: Yes     Comment: occ    Drug use: Yes     Types: Marijuana Octavio Jefferson)     Comment: nightly for sleep    Sexual activity: is not concerning for infection, I suspect contaminated catch based on the epithelial cells. His CBC does not show acute blood loss anemia. Her CMP does not show gross electrolyte derangement, abnormalities renal function or abnormalities in liver function. Her coags were reviewed lipase pregnancy were also reviewed    On exam the patient does have an internal hemorrhoid. Her vitals are stable. She is in no acute distress. There is no active bleeding. As such, she is appropriate for outpatient management. She will be referred to colorectal surgery as well as PCP. Discussed smoke cessation for roughly 3 minutes. At this time, the patient is feeling better with a benign serial examination. Presentation is not consistent with acute abdomen. Based on history, physical exam, risk factors, and diagnostics, my suspicion for disease processes including but not limited to bowel obstruction, acute pancreatitis, abscess, perforated viscous, diverticulitis, cholecystis, and appendicitis is very low. As such, the patient is deemed appropriate for outpatient management with close PCP follow up in several days. Usual strict return precautions communicated. I am the Primary Clinician of Record. FINAL IMPRESSION      1. Internal hemorrhoids    2. Rectal bleeding    3. Lower abdominal pain    4. Smoker          DISPOSITION/PLAN   DISPOSITION Decision To Discharge 09/06/2022 03:38:24 PM      PATIENT REFERREDTO:  Dipika Christopher DO  [address] [place] [address]  [phone]    Call in 1 day  Follow up on your recent ED visit    [facility]  [facility] [address]        DISCHARGE MEDICATIONS:  Discharge Medication List as of 9/6/2022  3:44 PM          DISCONTINUED MEDICATIONS:  Discharge Medication List as of 9/6/2022  3:44 PM                 (Please note that portions ofthis note were completed with a voice recognition program.  Efforts were made to edit the dictations but occasionally words are mis-transcribed.)    VLAD Felix CNP (electronically signed)             VLAD Felix CNP  09/06/22 8816

## 2022-09-20 ENCOUNTER — OFFICE VISIT (OUTPATIENT)
Dept: PSYCHIATRY | Age: 22
End: 2022-09-20
Payer: MEDICAID

## 2022-09-20 VITALS — WEIGHT: 139 LBS | BODY MASS INDEX: 25.42 KG/M2

## 2022-09-20 DIAGNOSIS — F41.0 GENERALIZED ANXIETY DISORDER WITH PANIC ATTACKS: ICD-10-CM

## 2022-09-20 DIAGNOSIS — F43.12 CHRONIC POST-TRAUMATIC STRESS DISORDER: ICD-10-CM

## 2022-09-20 DIAGNOSIS — F41.1 GENERALIZED ANXIETY DISORDER WITH PANIC ATTACKS: ICD-10-CM

## 2022-09-20 DIAGNOSIS — F33.1 MODERATE EPISODE OF RECURRENT MAJOR DEPRESSIVE DISORDER (HCC): ICD-10-CM

## 2022-09-20 PROCEDURE — 99214 OFFICE O/P EST MOD 30 MIN: CPT | Performed by: STUDENT IN AN ORGANIZED HEALTH CARE EDUCATION/TRAINING PROGRAM

## 2022-09-20 RX ORDER — DULOXETIN HYDROCHLORIDE 60 MG/1
60 CAPSULE, DELAYED RELEASE ORAL DAILY
Qty: 30 CAPSULE | Refills: 1 | Status: SHIPPED | OUTPATIENT
Start: 2022-09-20

## 2022-09-20 ASSESSMENT — PATIENT HEALTH QUESTIONNAIRE - PHQ9
10. IF YOU CHECKED OFF ANY PROBLEMS, HOW DIFFICULT HAVE THESE PROBLEMS MADE IT FOR YOU TO DO YOUR WORK, TAKE CARE OF THINGS AT HOME, OR GET ALONG WITH OTHER PEOPLE: 3
2. FEELING DOWN, DEPRESSED OR HOPELESS: 2
3. TROUBLE FALLING OR STAYING ASLEEP: 3
1. LITTLE INTEREST OR PLEASURE IN DOING THINGS: 2
6. FEELING BAD ABOUT YOURSELF - OR THAT YOU ARE A FAILURE OR HAVE LET YOURSELF OR YOUR FAMILY DOWN: 1
SUM OF ALL RESPONSES TO PHQ QUESTIONS 1-9: 19
8. MOVING OR SPEAKING SO SLOWLY THAT OTHER PEOPLE COULD HAVE NOTICED. OR THE OPPOSITE, BEING SO FIGETY OR RESTLESS THAT YOU HAVE BEEN MOVING AROUND A LOT MORE THAN USUAL: 2
SUM OF ALL RESPONSES TO PHQ QUESTIONS 1-9: 19
SUM OF ALL RESPONSES TO PHQ QUESTIONS 1-9: 19
9. THOUGHTS THAT YOU WOULD BE BETTER OFF DEAD, OR OF HURTING YOURSELF: 0
4. FEELING TIRED OR HAVING LITTLE ENERGY: 3
SUM OF ALL RESPONSES TO PHQ QUESTIONS 1-9: 19
5. POOR APPETITE OR OVEREATING: 3
SUM OF ALL RESPONSES TO PHQ9 QUESTIONS 1 & 2: 4
7. TROUBLE CONCENTRATING ON THINGS, SUCH AS READING THE NEWSPAPER OR WATCHING TELEVISION: 3

## 2022-09-20 ASSESSMENT — ANXIETY QUESTIONNAIRES
5. BEING SO RESTLESS THAT IT IS HARD TO SIT STILL: 3
2. NOT BEING ABLE TO STOP OR CONTROL WORRYING: 3
3. WORRYING TOO MUCH ABOUT DIFFERENT THINGS: 3
6. BECOMING EASILY ANNOYED OR IRRITABLE: 3
GAD7 TOTAL SCORE: 20
4. TROUBLE RELAXING: 3
7. FEELING AFRAID AS IF SOMETHING AWFUL MIGHT HAPPEN: 2
1. FEELING NERVOUS, ANXIOUS, OR ON EDGE: 3
IF YOU CHECKED OFF ANY PROBLEMS ON THIS QUESTIONNAIRE, HOW DIFFICULT HAVE THESE PROBLEMS MADE IT FOR YOU TO DO YOUR WORK, TAKE CARE OF THINGS AT HOME, OR GET ALONG WITH OTHER PEOPLE: EXTREMELY DIFFICULT

## 2022-09-20 NOTE — PROGRESS NOTES
Sarah Lange MD PSYCHIATRY PROGRESS NOTE    [place]  2000 09/20/22  Face to Face time: 30 minutes  PCP: Gabe Mobley DO    CC:   Chief Complaint   Patient presents with    Follow-up    Anxiety     Patient is a 20yo female without significant PMH who presents to the outpatient psychiatric clinic today for evaluation and management of depression and anxiety. A:  Patient's presentation today is indicative of continued difficulties with depression and anxiety that have not been ameliorated despite the use of duloxetine. We will attempt to modify the patient's medication regimen in order to provide her with improved support. Diagnosis:  Generalized anxiety disorder panic attacks  Posttraumatic stress disorder  Major depressive disorder, recurrent moderate  Social anxiety disorder, very severe  Borderline personality disorder    P:   1. We will plan to increase the patient's duloxetine to 60 mg daily for treatment of depression and anxiety as well as PTSD. Patient was cautioned regarding adverse effects this medication as had been described to her previously. 2.  We will plan to continue the patient's current medication of trazodone 50 mg nightly for insomnia. Medication Monitoring:    - PDMP reviewed: No current medications    Follow-up: 4 weeks    Safety: Pt was counseled on the potential for increased suicidal ideations and advised on potential options for dealing with these including hotlines, calling the office, or going to the nearest emergency room. __________________________________________________________________________    S:   Patient indicated that she had not seen anything significantly positive from the duloxetine, though she did recognize that it was also up against some significant familial stressors.   Patient noted that her 3-3/1year-old daughter was having some trouble sleeping that was then also contributing to the patient's difficulties with getting to bed at a regular hour. She noted that she is oversleeping significantly at this time, sleeping from 2 AM until approximately 7 PM and having to rely heavily on the assistance of family members in order to provide care for her children during the daytime. The patient has noted the sense of overwhelming fatigue despite the fact that she is sleeping as long as she is. She knows that she wakes up in the morning to take her duloxetine but then immediately goes right back to sleep. The patient acknowledged that she has also stopped smoking marijuana for approximately the past 1 week with little additional changes seen from this. Patient denied any SI/HI/AVH on evaluation today. ROS:  Review of Systems   Constitutional:  Positive for activity change and fatigue. HENT: Negative. Eyes: Negative. Respiratory: Negative. Cardiovascular: Negative. Gastrointestinal: Negative. Endocrine: Negative. Genitourinary: Negative. Musculoskeletal: Negative. Skin: Negative. Allergic/Immunologic: Negative. Neurological: Negative. Hematological: Negative. Psychiatric/Behavioral:  Positive for decreased concentration and dysphoric mood. The patient is nervous/anxious.        Brief Medical Hx:   Patient Active Problem List   Diagnosis    Generalized anxiety disorder with panic attacks    Primary insomnia    Current smoker    Chronic post-traumatic stress disorder    Moderate episode of recurrent major depressive disorder (HCC)    Borderline personality disorder ([facility] [address]    Social anxiety disorder    Chronic migraine without aura without status migrainosus, not intractable        Brief Psych Hx:  Hosp: 2x in 2015 after suicide attempts as well as a 6mo stay in a treatment facility as a teenager  Diagnoses: Bipolar disorder, depression, anxiety  Med trials: fluoxetine, lithium, sertraline (suicidal), cariprazine  Outpt: Previous psychiatrists since age 15, has a history of therapy/counseling since that time as well  NSSI: History of cutting, last done about 1mo ago. Tops of thighs and on her arms  Suicide Attempts: 2x in 2015 when she attempted to overdose on Trazodone       O:  Wt Readings from Last 3 Encounters:   10/13/22 138 lb 9.6 oz (62.9 kg)   09/28/22 140 lb (63.5 kg)   09/21/22 138 lb (62.6 kg)       Vitals:    09/20/22 1542   Weight: 139 lb (63 kg)       Mental Status Exam:   Appearance:    Appropriately dressed, appearing tired  Motor: No abnormal movements, tics or mannerisms. Eye Contact: Fair to good  Speech:    Mildly slowed rate  Language:   Appropriate diction  Mood/Affect:  \" Nothing is changed\"/blunting of affect apparent  Thought Process:   Generally linear, logical  Thought Content:    Depressive and anxious content present, no SI/HI  Hallucinations:   Denied, not seem to be responding to internal stimuli  Associations:   Intact  Attention/Concentration:   Intact  Orientation:    Alert and oriented x4  Memory:   Intact  Fund of Knowledge:    Appropriate for age and education  Insight/Judgement:   Intact/intact      OWEN-7 SCREENING 9/20/2022 8/23/2022   Feeling nervous, anxious, or on edge Nearly every day Nearly every day   Not being able to stop or control worrying Nearly every day More than half the days   Worrying too much about different things Nearly every day More than half the days   Trouble relaxing Nearly every day Nearly every day   Being so restless that it is hard to sit still Nearly every day More than half the days   Becoming easily annoyed or irritable Nearly every day Nearly every day   Feeling afraid as if something awful might happen More than half the days More than half the days   OWEN-7 Total Score 20 17   How difficult have these problems made it for you to do your work, take care of things at home, or get along with other people?  Extremely difficult -   Feeling nervous, anxious, or on edge - -   Not able to stop or control worrying - -   Worrying too much about different things - -   Trouble relaxing - -   Being so restless that it's hard to sit still - -   Becoming easily annoyed or irritable - -   Feeling afraid as if something awful might happen - -   OWEN-7 Total Score - -     PHQ-9 Questionaire 9/20/2022 8/23/2022   Little interest or pleasure in doing things 2 3   Feeling down, depressed, or hopeless 2 3   Trouble falling or staying asleep, or sleeping too much 3 3   Feeling tired or having little energy 3 3   Poor appetite or overeating 3 2   Feeling bad about yourself - or that you are a failure or have let yourself or your family down 1 1   Trouble concentrating on things, such as reading the newspaper or watching television 3 3   Moving or speaking so slowly that other people could have noticed. Or the opposite - being so fidgety or restless that you have been moving around a lot more than usual 2 1   Thoughts that you would be better off dead, or of hurting yourself in some way 0 1   PHQ-9 Total Score 19 20   If you checked off any problems, how difficult have these problems made it for you to do your work, take care of things at home, or get along with other people?  3 3      Labs:     Admission on 09/06/2022, Discharged on 09/06/2022   Component Date Value Ref Range Status    WBC 09/06/2022 6.3  4.0 - 11.0 K/uL Final    RBC 09/06/2022 4.78  4.00 - 5.20 M/uL Final    Hemoglobin 09/06/2022 14.8  12.0 - 16.0 g/dL Final    Hematocrit 09/06/2022 43.1  36.0 - 48.0 % Final    MCV 09/06/2022 90.2  80.0 - 100.0 fL Final    MCH 09/06/2022 31.1  26.0 - 34.0 pg Final    MCHC 09/06/2022 34.4  31.0 - 36.0 g/dL Final    RDW 09/06/2022 12.5  12.4 - 15.4 % Final    Platelets 31/30/3253 188  135 - 450 K/uL Final    MPV 09/06/2022 9.1  5.0 - 10.5 fL Final    Neutrophils % 09/06/2022 65.3  % Final    Lymphocytes % 09/06/2022 24.9  % Final    Monocytes % 09/06/2022 8.6  % Final    Eosinophils % 09/06/2022 0.7  % Final    Basophils % 09/06/2022 0.5  % Final    Neutrophils Absolute 09/06/2022 4.1  1.7 - 7.7 K/uL Final    Lymphocytes Absolute 09/06/2022 1.6  1.0 - 5.1 K/uL Final    Monocytes Absolute 09/06/2022 0.5  0.0 - 1.3 K/uL Final    Eosinophils Absolute 09/06/2022 0.0  0.0 - 0.6 K/uL Final    Basophils Absolute 09/06/2022 0.0  0.0 - 0.2 K/uL Final    Sodium 09/06/2022 139  136 - 145 mmol/L Final    Potassium reflex Magnesium 09/06/2022 3.6  3.5 - 5.1 mmol/L Final    Chloride 09/06/2022 102  99 - 110 mmol/L Final    CO2 09/06/2022 24  21 - 32 mmol/L Final    Anion Gap 09/06/2022 13  3 - 16 Final    Glucose 09/06/2022 116 (A)  70 - 99 mg/dL Final    BUN 09/06/2022 11  7 - 20 mg/dL Final    Creatinine 09/06/2022 0.6  0.6 - 1.1 mg/dL Final    GFR Non- 09/06/2022 >60  >60 Final    Comment: >60 mL/min/1.73m2 EGFR, calc. for ages 25 and older using the  MDRD formula (not corrected for weight), is valid for stable  renal function. GFR  09/06/2022 >60  >60 Final    Comment: Chronic Kidney Disease: less than 60 ml/min/1.73 sq.m. Kidney Failure: less than 15 ml/min/1.73 sq.m. Results valid for patients 18 years and older. Calcium 09/06/2022 9.9  8.3 - 10.6 mg/dL Final    Total Protein 09/06/2022 7.7  6.4 - 8.2 g/dL Final    Albumin 09/06/2022 4.5  3.4 - 5.0 g/dL Final    Albumin/Globulin Ratio 09/06/2022 1.4  1.1 - 2.2 Final    Total Bilirubin 09/06/2022 0.6  0.0 - 1.0 mg/dL Final    Alkaline Phosphatase 09/06/2022 70  40 - 129 U/L Final    ALT 09/06/2022 14  10 - 40 U/L Final    AST 09/06/2022 16  15 - 37 U/L Final    Protime 09/06/2022 13.3  11.7 - 14.5 sec Final    Comment: Effective 5-25-22 09:00am EST  Please note reference ranges have  changed for PT and INR Testing. INR 09/06/2022 1.02  0.87 - 1.14 Final    Comment: Effective 5/25/22 at 09:00am EST    Normal: 0.87 - 1.14  Therapeutic: 2.0 - 3.0  Pros.  Valve: 2.5 - 3.5  AMI: 2.0 - 3.0      aPTT 09/06/2022 32.2  23.0 - 34.3 sec Final    Comment: Therapeutic range: 73.0 - 102.0 sec    Effective 5-25-22 9:00am EST  Please note reference ranges have  changed for PTT Testing. Lipase 09/06/2022 18.0  13.0 - 60.0 U/L Final    Color, UA 09/06/2022 DARK YELLOW (A)  Straw/Yellow Final    Clarity, UA 09/06/2022 TURBID (A)  Clear Final    Glucose, Ur 09/06/2022 Negative  Negative mg/dL Final    Bilirubin Urine 09/06/2022 Negative  Negative Final    Ketones, Urine 09/06/2022 TRACE (A)  Negative mg/dL Final    Specific Gravity, UA 09/06/2022 1.029  1.005 - 1.030 Final    Blood, Urine 09/06/2022 Negative  Negative Final    pH, UA 09/06/2022 5.0  5.0 - 8.0 Final    Protein, UA 09/06/2022 TRACE (A)  Negative mg/dL Final    Urobilinogen, Urine 09/06/2022 1.0  <2.0 E.U./dL Final    Nitrite, Urine 09/06/2022 Negative  Negative Final    Leukocyte Esterase, Urine 09/06/2022 TRACE (A)  Negative Final    Microscopic Examination 09/06/2022 YES   Final    Urine Type 09/06/2022 NotGiven   Final    Urine Reflex to Culture 09/06/2022 Not Indicated   Final    hCG Qual 09/06/2022 Negative  Detects HCG level >10 MIU/mL Final    Bacteria, UA 09/06/2022 1+ (A)  None Seen /HPF Final    Hyaline Casts, UA 09/06/2022 2  0 - 8 /LPF Final    WBC, UA 09/06/2022 4  0 - 5 /HPF Final    RBC, UA 09/06/2022 1  0 - 4 /HPF Final    Epithelial Cells, UA 09/06/2022 14 (A)  0 - 5 /HPF Final    Comment: Urinalysis microscopic and digital image assisted microscopic  performed using the automated methodology ([id number] system).          EKG: None on file        Taco Shrestha MD  Psychiatrist

## 2022-09-21 ENCOUNTER — HOSPITAL ENCOUNTER (OUTPATIENT)
Dept: CT IMAGING | Age: 22
Discharge: HOME | End: 2022-09-21
Payer: MEDICAID

## 2022-09-21 ENCOUNTER — OFFICE VISIT (OUTPATIENT)
Dept: PRIMARY CARE CLINIC | Age: 22
End: 2022-09-21

## 2022-09-21 VITALS
SYSTOLIC BLOOD PRESSURE: 107 MMHG | TEMPERATURE: 97 F | HEART RATE: 91 BPM | BODY MASS INDEX: 25.4 KG/M2 | DIASTOLIC BLOOD PRESSURE: 75 MMHG | WEIGHT: 138 LBS | HEIGHT: 62 IN

## 2022-09-21 DIAGNOSIS — R19.7 DIARRHEA, UNSPECIFIED TYPE: ICD-10-CM

## 2022-09-21 DIAGNOSIS — R11.0 NAUSEA: ICD-10-CM

## 2022-09-21 DIAGNOSIS — R10.84 GENERALIZED ABDOMINAL PAIN: ICD-10-CM

## 2022-09-21 DIAGNOSIS — R10.84 GENERALIZED ABDOMINAL PAIN: Primary | ICD-10-CM

## 2022-09-21 DIAGNOSIS — N73.0 PID (ACUTE PELVIC INFLAMMATORY DISEASE): ICD-10-CM

## 2022-09-21 DIAGNOSIS — N89.8 VAGINAL DISCHARGE: ICD-10-CM

## 2022-09-21 LAB
A/G RATIO: 2 (ref 1.1–2.2)
ALBUMIN SERPL-MCNC: 5.1 G/DL (ref 3.4–5)
ALP BLD-CCNC: 77 U/L (ref 40–129)
ALT SERPL-CCNC: 12 U/L (ref 10–40)
ANION GAP SERPL CALCULATED.3IONS-SCNC: 13 MMOL/L (ref 3–16)
AST SERPL-CCNC: 15 U/L (ref 15–37)
BASOPHILS ABSOLUTE: 0.1 K/UL (ref 0–0.2)
BASOPHILS RELATIVE PERCENT: 1.5 %
BILIRUB SERPL-MCNC: <0.2 MG/DL (ref 0–1)
BUN BLDV-MCNC: 9 MG/DL (ref 7–20)
C-REACTIVE PROTEIN: 9.1 MG/L (ref 0–5.1)
CALCIUM SERPL-MCNC: 10.1 MG/DL (ref 8.3–10.6)
CHLORIDE BLD-SCNC: 101 MMOL/L (ref 99–110)
CO2: 25 MMOL/L (ref 21–32)
CONTROL: NORMAL
CREAT SERPL-MCNC: 0.6 MG/DL (ref 0.6–1.1)
EOSINOPHILS ABSOLUTE: 0 K/UL (ref 0–0.6)
EOSINOPHILS RELATIVE PERCENT: 1.2 %
GFR AFRICAN AMERICAN: >60
GFR NON-AFRICAN AMERICAN: >60
GLUCOSE BLD-MCNC: 108 MG/DL (ref 70–99)
HCT VFR BLD CALC: 43.1 % (ref 36–48)
HEMOGLOBIN: 14.4 G/DL (ref 12–16)
LACTIC ACID: 0.8 MMOL/L (ref 0.4–2)
LIPASE: 15 U/L (ref 13–60)
LYMPHOCYTES ABSOLUTE: 1.2 K/UL (ref 1–5.1)
LYMPHOCYTES RELATIVE PERCENT: 29.5 %
MCH RBC QN AUTO: 30.7 PG (ref 26–34)
MCHC RBC AUTO-ENTMCNC: 33.5 G/DL (ref 31–36)
MCV RBC AUTO: 91.6 FL (ref 80–100)
MONOCYTES ABSOLUTE: 0.7 K/UL (ref 0–1.3)
MONOCYTES RELATIVE PERCENT: 16 %
NEUTROPHILS ABSOLUTE: 2.2 K/UL (ref 1.7–7.7)
NEUTROPHILS RELATIVE PERCENT: 51.8 %
PDW BLD-RTO: 12.7 % (ref 12.4–15.4)
PLATELET # BLD: 179 K/UL (ref 135–450)
PMV BLD AUTO: 9.8 FL (ref 5–10.5)
POTASSIUM SERPL-SCNC: 4.1 MMOL/L (ref 3.5–5.1)
PREGNANCY TEST URINE, POC: NEGATIVE
RBC # BLD: 4.7 M/UL (ref 4–5.2)
SEDIMENTATION RATE, ERYTHROCYTE: 12 MM/HR (ref 0–20)
SODIUM BLD-SCNC: 139 MMOL/L (ref 136–145)
TOTAL PROTEIN: 7.6 G/DL (ref 6.4–8.2)
WBC # BLD: 4.2 K/UL (ref 4–11)

## 2022-09-21 PROCEDURE — 74176 CT ABD & PELVIS W/O CONTRAST: CPT

## 2022-09-21 RX ORDER — CEFTRIAXONE 500 MG/1
500 INJECTION, POWDER, FOR SOLUTION INTRAMUSCULAR; INTRAVENOUS ONCE
Qty: 1 EACH | Refills: 0
Start: 2022-09-21 | End: 2022-09-21

## 2022-09-21 RX ORDER — METRONIDAZOLE 500 MG/1
500 TABLET ORAL 2 TIMES DAILY
Qty: 14 TABLET | Refills: 0 | Status: SHIPPED | OUTPATIENT
Start: 2022-09-21 | End: 2022-09-28

## 2022-09-21 RX ORDER — DOXYCYCLINE HYCLATE 100 MG
100 TABLET ORAL 2 TIMES DAILY
Qty: 14 TABLET | Refills: 0 | Status: SHIPPED | OUTPATIENT
Start: 2022-09-21 | End: 2022-09-28

## 2022-09-21 ASSESSMENT — ENCOUNTER SYMPTOMS
VOMITING: 0
NAUSEA: 1
CONSTIPATION: 1
BACK PAIN: 0
ABDOMINAL DISTENTION: 0
DIARRHEA: 1
BLOOD IN STOOL: 1
ABDOMINAL PAIN: 1

## 2022-09-21 NOTE — PROGRESS NOTES
[facility]  2022    Neha Catherine (:  2000) is a 25 y.o. female, here for evaluation of the following medical concerns:    Chief Complaint   Patient presents with    Other     Severe cramps for a month and a half         ASSESSMENT/ PLAN  1. Generalized abdominal pain  Uncontrolled, this is a new problem. Differential is broad as patient has a pan positive review of systems. Will check stat CT scan, CBC, CMP, lipase, inflammatory markers, lactate to assess for diverticulitis, pancreatitis, appendicitis, evidence of gastritis. POC hCG negative. Urine sexual health organism and UTI PCR panels sent to assess for UTI or STI. Will initiate empiric treatment for pelvic inflammatory disease per below. Education provided on reporting to the emergency department if worsening of symptoms overnight, otherwise we will await results prior to initiating any further treatment  - CBC with Auto Differential; Future  - Comprehensive Metabolic Panel; Future  - Lipase; Future  - Sedimentation Rate; Future  - C-reactive protein; Future  - Lactic Acid; Future  - CT ABDOMEN PELVIS WO CONTRAST Additional Contrast? Radiologist Recommendation; Future  - Urinary Tract Infection Organism and Resistance ID by PCR  - Sexual Health Organism ID by PCR  - POCT urine pregnancy    2. Vaginal discharge  Per above, sexual health panel also check for BV    3. Nausea  Per above    4. Diarrhea, unspecified type  Uncontrolled, this is a new problem. Work-up per above for acute pathology, differential also includes IBS-D    5. PID (acute pelvic inflammatory disease)  Empiric treatment initiated today. Patient declines ceftriaxone injection. She will need to return to clinic to receive it if STI testing is positive  - metroNIDAZOLE (FLAGYL) 500 MG tablet; Take 1 tablet by mouth 2 times daily for 7 days  Dispense: 14 tablet; Refill: 0  - doxycycline hyclate (VIBRA-TABS) 100 MG tablet;  Take 1 tablet by mouth 2 times daily for 7 days  Dispense: 14 tablet; Refill: 0     Return if symptoms worsen or fail to improve. 48 minutes spent on chart review, care coordination and patient counseling regarding disease state, lifestyle modifications and/or health maintenance screening. HPI  Patient presents today with acute concerns of abdominal pain, nausea, vaginal discharge and diarrhea. Patient states that symptoms have been present for the past month, however in the past few days have been severe. She notes sharp, bilateral upper quadrant abdominal pain and suprapubic pain. She notes that it is associated with nausea, no vomiting. The pain is worse after eating, but also present when she is not eating. She states that she has both diarrhea and constipation, but cannot quantify how often she is having bowel movements. No pain with bowel movements. She does have a history of internal hemorrhoids, and states that her bowel movements have been intermittently bloody. She endorses malodorous, thick vaginal discharge. She is sexually active with a male partner, same partner for the past 6 months. She has a history of a tubal ligation, and wants to know if the symptoms are related to \"post tubal ligation syndrome. \"  No other history of abdominal surgery. She denies fever, chills. She endorses dysuria, denies hematuria or flank pain. ROS  Review of Systems   Constitutional:  Negative for activity change, appetite change, fatigue, fever and unexpected weight change. Cardiovascular:  Negative for leg swelling. Gastrointestinal:  Positive for abdominal pain, blood in stool, constipation, diarrhea and nausea. Negative for abdominal distention and vomiting. Genitourinary:  Positive for dysuria, pelvic pain and vaginal discharge. Negative for difficulty urinating, flank pain, genital sores, hematuria, menstrual problem, vaginal bleeding and vaginal pain. Musculoskeletal:  Negative for back pain and myalgias. Allergic/Immunologic: Negative for food allergies. Psychiatric/Behavioral:  The patient is nervous/anxious. HISTORIES  Current Outpatient Medications on File Prior to Visit   Medication Sig Dispense Refill    DULoxetine (CYMBALTA) 60 MG extended release capsule Take 1 capsule by mouth daily 30 capsule 1    valACYclovir (VALTREX) 500 MG tablet Take 1 tablet by mouth daily 30 tablet 11    traZODone (DESYREL) 50 MG tablet Take 1 tablet by mouth nightly as needed for Sleep 30 tablet 5    naproxen (NAPROSYN) 500 MG tablet Take 1 tablet by mouth 2 times daily (with meals) (Patient not taking: Reported on 9/20/2022) 28 tablet 0     No current facility-administered medications on file prior to visit. Past Medical History:   Diagnosis Date    Bipolar 1 disorder [facility])      Patient Active Problem List   Diagnosis    Generalized anxiety disorder with panic attacks    Primary insomnia    Current smoker    Chronic post-traumatic stress disorder    Moderate episode of recurrent major depressive disorder (HCC)    Borderline personality disorder ([address]    Social anxiety disorder       PE  Vitals:    09/21/22 1234   BP: 107/75   Site: Left Upper Arm   Position: Sitting   Cuff Size: Large Adult   Pulse: 91   Temp: 97 °F (36.1 °C)   TempSrc: Temporal   Weight: 138 lb (62.6 kg)   Height: 5' 2\" (1.575 m)     Estimated body mass index is 25.24 kg/m² as calculated from the following:    Height as of this encounter: 5' 2\" (1.575 m). Weight as of this encounter: 138 lb (62.6 kg). Physical Exam  Vitals and nursing note reviewed. Constitutional:       Appearance: Normal appearance. She is not ill-appearing. HENT:      Head: Normocephalic and atraumatic. Mouth/Throat:      Mouth: Mucous membranes are moist.      Pharynx: Oropharynx is clear. Cardiovascular:      Rate and Rhythm: Normal rate and regular rhythm. Pulmonary:      Effort: Pulmonary effort is normal.      Breath sounds: Normal breath sounds. Abdominal:      General: Abdomen is flat. Bowel sounds are normal. There is no distension. Palpations: Abdomen is soft. There is no mass. Tenderness: There is abdominal tenderness. There is no right CVA tenderness, left CVA tenderness, guarding or rebound. Comments: Mild tenderness to palpation epigastric region, suprapubic region. No rebound tenderness. Skin:     General: Skin is warm. Coloration: Skin is not jaundiced. Neurological:      Mental Status: She is alert. Psychiatric:         Mood and Affect: Mood normal.         Behavior: Behavior normal.       Florence Gonzalez DO    This dictation was generated by voice recognition computer software. Although all attempts are made to edit the dictation for accuracy, there may be errors in the transcription that are not intended.

## 2022-09-22 LAB
ACINETOBACTER BAUMANNII BY PCR: NOT DETECTED
BACTEROIDES FRAGILIS BY PCR: NOT DETECTED
CANDIDA PARAPSILOSIS: NOT DETECTED
CANDIDA SPP: NOT DETECTED
CARBAPENEM RESISTANCE OXA-48 GENE BY PCR: NOT DETECTED
CEPHALOSPORIN RESISTANCE AMPC GENE: NOT DETECTED
CHLAMYDIA TRACHOMATIS BY RT-PCR: NOT DETECTED
CITROBACTER FREUNDII: NOT DETECTED
ENTEROBACTER CLOACAE: NOT DETECTED
ENTEROCOCCUS SPP. (E. FAECALIS, E. FAECIUM): NOT DETECTED
ESBL RESISTANCE: NOT DETECTED
ESCHERICHIA COLI BY PCR: NOT DETECTED
GARDNERELLA VAGINALIS: ABNORMAL
KLEBSIELLA OXYTOCA BY PCR: NOT DETECTED
KLEBSIELLA PNEUMONIAE: NOT DETECTED
LACTOBACILLUS SPP.: ABNORMAL
M HOMINIS SPEC QL CULT: NOT DETECTED
MACROLIDE RESISTANCE: ABNORMAL
METHICILLIN RESISTANCE: NOT DETECTED
MORGANELLA MORGANII: NOT DETECTED
MYCOPLASMA GENITALIUM PCR: NOT DETECTED
NEISSERIA GONORRHOEAE BY RT-PCR: NOT DETECTED
PROTEUS SPP (PROTEUS MIRABILIS, PROTEUS VULGARIS): NOT DETECTED
PROVIDENCIA STUARTII: NOT DETECTED
PSEUDOMONAS AERUGINOSA BY PCR: NOT DETECTED
QUINOLONE AND FLUOROQUINOLONE RESISTANCE: NOT DETECTED
SERRATIA MARCESCENS BY PCR: NOT DETECTED
STAPHYLOCOCCUS AUREUS BY PCR: NOT DETECTED
STAPHYLOCOCCUS SAPROPHYTICUS: NOT DETECTED
STREPTOCOCCUS AGALACTIAE BY PCR: NOT DETECTED
STREPTOCOCCUS PYOGENES  BY PCR: NOT DETECTED
TETRACYCLINE RESISTANCE: ABNORMAL
TRICHOMONAS VAGINALIS: NOT DETECTED
TRIMETHOPRIM/SULFONAMIDE RESISTANCE: NOT DETECTED
UREAPLASMA UREALYTICUM BY PCR: DETECTED

## 2022-09-23 DIAGNOSIS — N76.0 BV (BACTERIAL VAGINOSIS): Primary | ICD-10-CM

## 2022-09-23 DIAGNOSIS — B96.89 BV (BACTERIAL VAGINOSIS): Primary | ICD-10-CM

## 2022-09-28 ENCOUNTER — OFFICE VISIT (OUTPATIENT)
Dept: PRIMARY CARE CLINIC | Age: 22
End: 2022-09-28
Payer: MEDICAID

## 2022-09-28 VITALS
TEMPERATURE: 97 F | SYSTOLIC BLOOD PRESSURE: 109 MMHG | DIASTOLIC BLOOD PRESSURE: 71 MMHG | WEIGHT: 140 LBS | HEART RATE: 89 BPM | BODY MASS INDEX: 25.76 KG/M2 | HEIGHT: 62 IN

## 2022-09-28 DIAGNOSIS — B96.89 BV (BACTERIAL VAGINOSIS): Primary | ICD-10-CM

## 2022-09-28 DIAGNOSIS — N76.0 BV (BACTERIAL VAGINOSIS): Primary | ICD-10-CM

## 2022-09-28 PROCEDURE — 99214 OFFICE O/P EST MOD 30 MIN: CPT | Performed by: STUDENT IN AN ORGANIZED HEALTH CARE EDUCATION/TRAINING PROGRAM

## 2022-09-28 RX ORDER — METRONIDAZOLE 7.5 MG/G
1 GEL VAGINAL DAILY
Qty: 70 G | Refills: 0 | Status: SHIPPED | OUTPATIENT
Start: 2022-09-28 | End: 2022-10-03

## 2022-09-28 ASSESSMENT — ENCOUNTER SYMPTOMS
NAUSEA: 0
CONSTIPATION: 0
BLOOD IN STOOL: 0
VOMITING: 0
DIARRHEA: 0
ABDOMINAL DISTENTION: 0
ABDOMINAL PAIN: 0

## 2022-09-28 NOTE — PROGRESS NOTES
[facility]  2022    Toro Bose (:  2000) is a 25 y.o. female, here for evaluation of the following medical concerns:    Chief Complaint   Patient presents with    Other     Leg pain since she started antibiotic 4 days ago and throwing antibitoic back up         ASSESSMENT/ PLAN  1. BV (bacterial vaginosis)  Uncontrolled, this is a new problem. Discontinue oral Flagyl, initiate MetroGel. Avoid drinking alcohol while taking this medication, and follow-up if vaginal discharge is persistent and worsening following completion of antibiotic.  - metroNIDAZOLE (METROGEL) 0.75 % vaginal gel; Place 1 Applicatorful vaginally daily for 5 days  Dispense: 70 g; Refill: 0     Return if symptoms worsen or fail to improve. HPI  Patient presents today for concerns of medication side effect. She was prescribed Flagyl for empiric treatment of PID. Subsequent testing did not show evidence of PID, but did reveal bacterial vaginosis. It was recommended that she complete the course of Flagyl as she is having vaginal discharge. States that she took a dose, threw up and experienced tingling in her legs. She has not taken anymore doses of the medication, and the nausea, vomiting, leg tingling has resolved. No fever, chills, abdominal pain, dysuria. ROS  Review of Systems   Constitutional:  Negative for activity change, appetite change, fatigue, fever and unexpected weight change. Cardiovascular:  Negative for leg swelling. Gastrointestinal:  Negative for abdominal distention, abdominal pain, blood in stool, constipation, diarrhea, nausea and vomiting. Genitourinary:  Positive for vaginal discharge. Negative for dysuria and flank pain. Musculoskeletal:  Negative for myalgias. Allergic/Immunologic: Negative for food allergies. Psychiatric/Behavioral:  The patient is not nervous/anxious.       HISTORIES  Current Outpatient Medications on File Prior to Visit   Medication Sig Dispense Refill DULoxetine (CYMBALTA) 60 MG extended release capsule Take 1 capsule by mouth daily 30 capsule 1    valACYclovir (VALTREX) 500 MG tablet Take 1 tablet by mouth daily 30 tablet 11    traZODone (DESYREL) 50 MG tablet Take 1 tablet by mouth nightly as needed for Sleep 30 tablet 5    metroNIDAZOLE (FLAGYL) 500 MG tablet Take 1 tablet by mouth 2 times daily for 7 days (Patient not taking: Reported on 9/28/2022) 14 tablet 0    doxycycline hyclate (VIBRA-TABS) 100 MG tablet Take 1 tablet by mouth 2 times daily for 7 days (Patient not taking: Reported on 9/28/2022) 14 tablet 0    naproxen (NAPROSYN) 500 MG tablet Take 1 tablet by mouth 2 times daily (with meals) (Patient not taking: Reported on 9/20/2022) 28 tablet 0     No current facility-administered medications on file prior to visit. Past Medical History:   Diagnosis Date    Bipolar 1 disorder [facility])      Patient Active Problem List   Diagnosis    Generalized anxiety disorder with panic attacks    Primary insomnia    Current smoker    Chronic post-traumatic stress disorder    Moderate episode of recurrent major depressive disorder (HCC)    Borderline personality disorder ([address]    Social anxiety disorder       PE  Vitals:    09/28/22 1451   BP: 109/71   Site: Right Upper Arm   Position: Sitting   Cuff Size: Medium Adult   Pulse: 89   Temp: 97 °F (36.1 °C)   TempSrc: Temporal   Weight: 140 lb (63.5 kg)   Height: 5' 2\" (1.575 m)     Estimated body mass index is 25.61 kg/m² as calculated from the following:    Height as of this encounter: 5' 2\" (1.575 m). Weight as of this encounter: 140 lb (63.5 kg). Physical Exam  Vitals and nursing note reviewed. Constitutional:       Appearance: Normal appearance. She is not ill-appearing. HENT:      Head: Normocephalic and atraumatic. Mouth/Throat:      Mouth: Mucous membranes are moist.      Pharynx: Oropharynx is clear. Cardiovascular:      Rate and Rhythm: Normal rate and regular rhythm.    Pulmonary: Effort: Pulmonary effort is normal.      Breath sounds: Normal breath sounds. Abdominal:      General: Abdomen is flat. Bowel sounds are normal. There is no distension. Palpations: Abdomen is soft. Tenderness: There is no abdominal tenderness. There is no right CVA tenderness, left CVA tenderness, guarding or rebound. Skin:     General: Skin is warm. Coloration: Skin is not jaundiced. Neurological:      Mental Status: She is alert. Psychiatric:         Mood and Affect: Mood normal.         Behavior: Behavior normal.       Tricia Bertrand,     This dictation was generated by voice recognition computer software. Although all attempts are made to edit the dictation for accuracy, there may be errors in the transcription that are not intended.

## 2022-10-13 ENCOUNTER — OFFICE VISIT (OUTPATIENT)
Dept: PRIMARY CARE CLINIC | Age: 22
End: 2022-10-13
Payer: MEDICAID

## 2022-10-13 VITALS
SYSTOLIC BLOOD PRESSURE: 110 MMHG | HEART RATE: 90 BPM | WEIGHT: 138.6 LBS | BODY MASS INDEX: 25.51 KG/M2 | HEIGHT: 62 IN | TEMPERATURE: 97 F | DIASTOLIC BLOOD PRESSURE: 71 MMHG

## 2022-10-13 DIAGNOSIS — G44.40 MEDICATION OVERUSE HEADACHE: ICD-10-CM

## 2022-10-13 DIAGNOSIS — G43.709 CHRONIC MIGRAINE WITHOUT AURA WITHOUT STATUS MIGRAINOSUS, NOT INTRACTABLE: Primary | ICD-10-CM

## 2022-10-13 PROCEDURE — 99214 OFFICE O/P EST MOD 30 MIN: CPT | Performed by: STUDENT IN AN ORGANIZED HEALTH CARE EDUCATION/TRAINING PROGRAM

## 2022-10-13 ASSESSMENT — ENCOUNTER SYMPTOMS
NAUSEA: 1
VOMITING: 1
WHEEZING: 0
SHORTNESS OF BREATH: 0
PHOTOPHOBIA: 1

## 2022-10-13 NOTE — PROGRESS NOTES
[facility]  10/13/2022    Kyung Wilson (:  2000) is a 25 y.o. female, here for evaluation of the following medical concerns:    Chief Complaint   Patient presents with    Migraine     Severe for about a year out of 30 days she has one a day on a scale of 10 pain would be a 10  so bad she feels she may  vomit, with diziness and blurry vision         ASSESSMENT/ PLAN  1. Chronic migraine without aura without status migrainosus, not intractable  Uncontrolled, initiate Nurtec although I do feel her symptoms will improve significantly when she discontinues daily NSAID, Excedrin use. She does have red flag symptom of vomiting, however states that MRI brain 1 year ago for this same problem was normal and exam was benign. Records request for MRI brain, follow-up in 4 weeks to recheck symptoms. If MRI brain record cannot be obtained, consider checking CT head. - Rimegepant Sulfate 75 MG TBDP; Take 75 mg by mouth every other day  Dispense: 16 tablet; Refill: 11    2. Medication overuse headache  Counseling provided on avoidance of daily use of NSAIDs, Excedrin. Increase hydration, keep headache diary and treat underlying migraine disorder per above. Return in about 4 weeks (around 11/10/2022) for migraine headache, pap smear. HPI  Patient presents today with concerns of chronic headache. She notes previous diagnosis of migraine. States that she underwent a brain MRI in 2021 which was normal.  She endorses headache daily, cannot quantify how many hours it lasts for. States that everything triggers it and nothing relieves it. She is taking NSAIDs and Excedrin over-the-counter daily. She endorses blurry vision, nausea and vomiting. Denies syncope, tinnitus, diplopia or loss of vision, numbness or weakness of her upper or lower extremity. ROS  Review of Systems   Constitutional:  Negative for activity change and unexpected weight change. HENT:  Negative for tinnitus. Eyes:  Positive for photophobia and visual disturbance. Respiratory:  Negative for shortness of breath and wheezing. Cardiovascular:  Negative for chest pain, palpitations and leg swelling. Gastrointestinal:  Positive for nausea and vomiting. Genitourinary:  Negative for decreased urine volume. Neurological:  Positive for headaches. Negative for tremors, seizures, syncope, facial asymmetry, speech difficulty, weakness, light-headedness and numbness. Psychiatric/Behavioral:  The patient is nervous/anxious. HISTORIES  Current Outpatient Medications on File Prior to Visit   Medication Sig Dispense Refill    DULoxetine (CYMBALTA) 60 MG extended release capsule Take 1 capsule by mouth daily 30 capsule 1    valACYclovir (VALTREX) 500 MG tablet Take 1 tablet by mouth daily 30 tablet 11    traZODone (DESYREL) 50 MG tablet Take 1 tablet by mouth nightly as needed for Sleep 30 tablet 5     No current facility-administered medications on file prior to visit. Past Medical History:   Diagnosis Date    Bipolar 1 disorder [facility])      Patient Active Problem List   Diagnosis    Generalized anxiety disorder with panic attacks    Primary insomnia    Current smoker    Chronic post-traumatic stress disorder    Moderate episode of recurrent major depressive disorder (HCC)    Borderline personality disorder ([facility] [address]    Social anxiety disorder    Chronic migraine without aura without status migrainosus, not intractable       PE  Vitals:    10/13/22 1422   BP: 110/71   Pulse: 90   Temp: 97 °F (36.1 °C)   Weight: 138 lb 9.6 oz (62.9 kg)   Height: 5' 2\" (1.575 m)     Estimated body mass index is 25.35 kg/m² as calculated from the following:    Height as of this encounter: 5' 2\" (1.575 m). Weight as of this encounter: 138 lb 9.6 oz (62.9 kg). Physical Exam  Vitals reviewed. Constitutional:       General: She is not in acute distress. Appearance: Normal appearance.    HENT:      Head: Normocephalic and

## 2022-10-17 ASSESSMENT — ENCOUNTER SYMPTOMS
EYES NEGATIVE: 1
ALLERGIC/IMMUNOLOGIC NEGATIVE: 1
RESPIRATORY NEGATIVE: 1
GASTROINTESTINAL NEGATIVE: 1

## 2022-10-27 ENCOUNTER — OFFICE VISIT (OUTPATIENT)
Dept: PRIMARY CARE CLINIC | Age: 22
End: 2022-10-27
Payer: MEDICAID

## 2022-10-27 VITALS
HEIGHT: 62 IN | BODY MASS INDEX: 25.36 KG/M2 | DIASTOLIC BLOOD PRESSURE: 71 MMHG | HEART RATE: 89 BPM | SYSTOLIC BLOOD PRESSURE: 106 MMHG | WEIGHT: 137.8 LBS | TEMPERATURE: 97 F

## 2022-10-27 DIAGNOSIS — N93.9 VAGINAL BLEEDING: Primary | ICD-10-CM

## 2022-10-27 PROCEDURE — 99214 OFFICE O/P EST MOD 30 MIN: CPT | Performed by: STUDENT IN AN ORGANIZED HEALTH CARE EDUCATION/TRAINING PROGRAM

## 2022-10-27 RX ORDER — NAPROXEN 500 MG/1
500 TABLET ORAL 2 TIMES DAILY WITH MEALS
Qty: 30 TABLET | Refills: 0 | Status: SHIPPED | OUTPATIENT
Start: 2022-10-27

## 2022-10-27 ASSESSMENT — ENCOUNTER SYMPTOMS
NAUSEA: 0
ABDOMINAL PAIN: 0

## 2022-10-27 NOTE — PROGRESS NOTES
[facility]  10/27/2022    Bright Douglas (:  2000) is a 25 y.o. female, here for evaluation of the following medical concerns:    Chief Complaint   Patient presents with    Abdominal Pain     With vaginal bleeding for a week         ASSESSMENT/ PLAN  1. Vaginal bleeding  Uncontrolled, this is a new problem. Differential includes anemia, thyroid dysfunction, pregnancy/miscarriage, fibroids or thickened uterine lining, cervical disorder or other. Check labs, referral placed to gynecology for continued management  - CBC with Auto Differential; Future  - TSH with Reflex; Future  - Protime/INR & PTT; Future  - HCG, QUANTITATIVE, PREGNANCY; Future  - Beth Frost MD, Gynecology, [facility]  - Prolactin; Future  - naproxen (NAPROSYN) 500 MG tablet; Take 1 tablet by mouth 2 times daily (with meals)  Dispense: 30 tablet; Refill: 0  - Iron and TIBC; Future     Return if symptoms worsen or fail to improve. HPI  Patient presents today with concerns of vaginal bleeding. Notes that her menstrual cycle is typically very regular, however she began bleeding a few days ago unexpectedly. [facility] 10/18/2022. Birth control is tubal ligation. She is sexually active with male partner. Endorses abdominal cramping which is typical of her menstrual cycle. Denies easy bleeding, bruising, epistaxis, hematuria or blood in her stool. No fever, chills, pelvic pain, dysuria, vaginal discharge or odor. ROS  Review of Systems   Constitutional:  Negative for fatigue and unexpected weight change. Gastrointestinal:  Negative for abdominal pain and nausea. Genitourinary:  Positive for vaginal bleeding. Negative for difficulty urinating, dyspareunia, dysuria, genital sores, hematuria, pelvic pain and vaginal discharge.      HISTORIES  Current Outpatient Medications on File Prior to Visit   Medication Sig Dispense Refill    Rimegepant Sulfate 75 MG TBDP Take 75 mg by mouth every other day 16 tablet 11    DULoxetine (CYMBALTA) 60 MG extended release capsule Take 1 capsule by mouth daily 30 capsule 1    valACYclovir (VALTREX) 500 MG tablet Take 1 tablet by mouth daily 30 tablet 11    traZODone (DESYREL) 50 MG tablet Take 1 tablet by mouth nightly as needed for Sleep 30 tablet 5     No current facility-administered medications on file prior to visit. Past Medical History:   Diagnosis Date    Bipolar 1 disorder [facility])      Patient Active Problem List   Diagnosis    Generalized anxiety disorder with panic attacks    Primary insomnia    Current smoker    Chronic post-traumatic stress disorder    Moderate episode of recurrent major depressive disorder (HCC)    Borderline personality disorder ([facility] [address]    Social anxiety disorder    Chronic migraine without aura without status migrainosus, not intractable       PE  Vitals:    10/27/22 1454   BP: 106/71   Pulse: 89   Temp: 97 °F (36.1 °C)   TempSrc: Temporal   Weight: 137 lb 12.8 oz (62.5 kg)   Height: 5' 2\" (1.575 m)     Estimated body mass index is 25.2 kg/m² as calculated from the following:    Height as of this encounter: 5' 2\" (1.575 m). Weight as of this encounter: 137 lb 12.8 oz (62.5 kg). Physical Exam  Vitals reviewed. Constitutional:       Appearance: Normal appearance. She is not ill-appearing. HENT:      Head: Normocephalic and atraumatic. Pulmonary:      Effort: Pulmonary effort is normal.   Abdominal:      General: Abdomen is flat. Palpations: Abdomen is soft. Tenderness: There is no abdominal tenderness. Neurological:      Mental Status: She is alert. Psychiatric:         Mood and Affect: Mood normal.         Behavior: Behavior normal.       Minniemichael Jaquez DO    This dictation was generated by voice recognition computer software. Although all attempts are made to edit the dictation for accuracy, there may be errors in the transcription that are not intended.

## 2022-10-27 NOTE — PATIENT INSTRUCTIONS
[facility]   [address]   [facility], [address] [place] [address]   LET:[phone]

## 2022-10-28 ENCOUNTER — HOSPITAL ENCOUNTER (EMERGENCY)
Age: 22
Discharge: HOME | End: 2022-10-28
Payer: MEDICAID

## 2022-10-28 VITALS
TEMPERATURE: 98.6 F | HEART RATE: 89 BPM | DIASTOLIC BLOOD PRESSURE: 77 MMHG | RESPIRATION RATE: 20 BRPM | OXYGEN SATURATION: 98 % | SYSTOLIC BLOOD PRESSURE: 117 MMHG | WEIGHT: 137 LBS | BODY MASS INDEX: 25.21 KG/M2 | HEIGHT: 62 IN

## 2022-10-28 DIAGNOSIS — R10.9 ABDOMINAL PAIN, UNSPECIFIED ABDOMINAL LOCATION: ICD-10-CM

## 2022-10-28 DIAGNOSIS — N93.9 VAGINAL BLEEDING: Primary | ICD-10-CM

## 2022-10-28 LAB
A/G RATIO: 1.7 (ref 1.1–2.2)
ALBUMIN SERPL-MCNC: 4.7 G/DL (ref 3.4–5)
ALP BLD-CCNC: 75 U/L (ref 40–129)
ALT SERPL-CCNC: 20 U/L (ref 10–40)
ANION GAP SERPL CALCULATED.3IONS-SCNC: 8 MMOL/L (ref 3–16)
AST SERPL-CCNC: 21 U/L (ref 15–37)
BASOPHILS ABSOLUTE: 0 K/UL (ref 0–0.2)
BASOPHILS RELATIVE PERCENT: 0.5 %
BILIRUB SERPL-MCNC: <0.2 MG/DL (ref 0–1)
BUN BLDV-MCNC: 10 MG/DL (ref 7–20)
CALCIUM SERPL-MCNC: 10.1 MG/DL (ref 8.3–10.6)
CHLORIDE BLD-SCNC: 106 MMOL/L (ref 99–110)
CO2: 27 MMOL/L (ref 21–32)
CREAT SERPL-MCNC: 0.6 MG/DL (ref 0.6–1.1)
EOSINOPHILS ABSOLUTE: 0 K/UL (ref 0–0.6)
EOSINOPHILS RELATIVE PERCENT: 0.3 %
GFR SERPL CREATININE-BSD FRML MDRD: >60 ML/MIN/{1.73_M2}
GLUCOSE BLD-MCNC: 99 MG/DL (ref 70–99)
HCG QUALITATIVE: NEGATIVE
HCT VFR BLD CALC: 43.2 % (ref 36–48)
HEMOGLOBIN: 14.2 G/DL (ref 12–16)
LYMPHOCYTES ABSOLUTE: 1.6 K/UL (ref 1–5.1)
LYMPHOCYTES RELATIVE PERCENT: 23.3 %
MCH RBC QN AUTO: 29.3 PG (ref 26–34)
MCHC RBC AUTO-ENTMCNC: 32.9 G/DL (ref 31–36)
MCV RBC AUTO: 89.2 FL (ref 80–100)
MONOCYTES ABSOLUTE: 0.6 K/UL (ref 0–1.3)
MONOCYTES RELATIVE PERCENT: 9 %
NEUTROPHILS ABSOLUTE: 4.5 K/UL (ref 1.7–7.7)
NEUTROPHILS RELATIVE PERCENT: 66.9 %
PDW BLD-RTO: 12.5 % (ref 12.4–15.4)
PLATELET # BLD: 215 K/UL (ref 135–450)
PMV BLD AUTO: 9.4 FL (ref 5–10.5)
POTASSIUM REFLEX MAGNESIUM: 4 MMOL/L (ref 3.5–5.1)
RBC # BLD: 4.85 M/UL (ref 4–5.2)
SODIUM BLD-SCNC: 141 MMOL/L (ref 136–145)
TOTAL PROTEIN: 7.5 G/DL (ref 6.4–8.2)
WBC # BLD: 6.8 K/UL (ref 4–11)

## 2022-10-28 PROCEDURE — 93005 ELECTROCARDIOGRAM TRACING: CPT

## 2022-10-28 PROCEDURE — 99284 EMERGENCY DEPT VISIT MOD MDM: CPT

## 2022-10-28 PROCEDURE — 84703 CHORIONIC GONADOTROPIN ASSAY: CPT

## 2022-10-28 PROCEDURE — 80053 COMPREHEN METABOLIC PANEL: CPT

## 2022-10-28 PROCEDURE — 36415 COLL VENOUS BLD VENIPUNCTURE: CPT

## 2022-10-28 PROCEDURE — 85025 COMPLETE CBC W/AUTO DIFF WBC: CPT

## 2022-10-28 ASSESSMENT — ENCOUNTER SYMPTOMS
RHINORRHEA: 0
CONSTIPATION: 0
BACK PAIN: 0
ABDOMINAL PAIN: 1
EYE PAIN: 0
NAUSEA: 0
SHORTNESS OF BREATH: 0
COUGH: 0
VOMITING: 0
SORE THROAT: 0
DIARRHEA: 0

## 2022-10-28 NOTE — Clinical Note
Davie Hidalgo was seen and treated in our emergency department on 10/28/2022. She may return to work on 10/29/2022. If you have any questions or concerns, please don't hesitate to call.       YANELIS Magana

## 2022-10-29 LAB
EKG ATRIAL RATE: 89 BPM
EKG DIAGNOSIS: NORMAL
EKG P AXIS: 61 DEGREES
EKG P-R INTERVAL: 150 MS
EKG Q-T INTERVAL: 322 MS
EKG QRS DURATION: 86 MS
EKG QTC CALCULATION (BAZETT): 391 MS
EKG R AXIS: 79 DEGREES
EKG T AXIS: 26 DEGREES
EKG VENTRICULAR RATE: 89 BPM

## 2022-10-29 PROCEDURE — 93010 ELECTROCARDIOGRAM REPORT: CPT | Performed by: INTERNAL MEDICINE

## 2022-10-29 NOTE — ED PROVIDER NOTES
[facility]        Pt Name: Braxton Sacks  MRN: [id number]  [place] 2000  Date of evaluation: 10/28/2022  Provider: YANELIS Prather  PCP: Theo Muñoz DO  Note Started: 9:36 PM EDT       ALFREDA. I have evaluated this patient. My supervising physician was available for consultation. CHIEF COMPLAINT       Chief Complaint   Patient presents with    Abdominal Pain     Patient states she started having lower mid abdominal pain as well as vaginal bleeding. Patient states she saw her pcp and was given an order for an ultrasound as well as blood work. Patient states she also started having right sided sharp chest pain this morning. HISTORY OF PRESENT ILLNESS   (Location, Timing/Onset, Context/Setting, Quality, Duration, Modifying Factors, Severity, Associated Signs and Symptoms)  Note limiting factors. Chief Complaint: Vaginal bleeding and abdominal pain    Braxton Sacks is a 25 y.o. female who presents to the emergency room due to vaginal bleeding and abdominal pain. Patient states the vaginal bleeding has been going on for the past week. She states it is associated when she has a bowel movement only. Patient states that she is not needing to wear a tampon or pad for this bleeding as it only occurs whenever she is having a bowel movement or applying pressure to her abdomen. Patient states that she has had tubal ligation and  in the past.  Patient states is also associated with some lower abdominal pain. Patient states that she saw her PCP 2 days ago and was given an order for an ultrasound that she is going to get tomorrow and also orders for blood work. Patient states that she has noticed some palpitations at times and some chest tightness but no chest pain. Patient denies shortness of breath or difficulty breathing. Patient has any fevers, chills, nausea vomiting.     Nursing Notes were all reviewed and agreed with or any disagreements were addressed in the HPI. REVIEW OF SYSTEMS    (2-9 systems for level 4, 10 or more for level 5)     Review of Systems   Constitutional:  Negative for chills, diaphoresis and fever. HENT:  Negative for congestion, rhinorrhea and sore throat. Eyes:  Negative for pain and visual disturbance. Respiratory:  Negative for cough and shortness of breath. Cardiovascular:  Negative for chest pain and leg swelling. Gastrointestinal:  Positive for abdominal pain. Negative for constipation, diarrhea, nausea and vomiting. Genitourinary:  Positive for vaginal bleeding. Negative for difficulty urinating, dysuria and frequency. Musculoskeletal:  Negative for back pain and neck pain. Skin:  Negative for rash and wound. Neurological:  Negative for dizziness and light-headedness. Positives and Pertinent negatives as per HPI. Except as noted above in the ROS, all other systems were reviewed and negative.        PAST MEDICAL HISTORY     Past Medical History:   Diagnosis Date    Bipolar 1 disorder ([facility] [address]          SURGICAL HISTORY     Past Surgical History:   Procedure Laterality Date     SECTION      TUBAL LIGATION           CURRENTMEDICATIONS       Previous Medications    DULOXETINE (CYMBALTA) 60 MG EXTENDED RELEASE CAPSULE    Take 1 capsule by mouth daily    NAPROXEN (NAPROSYN) 500 MG TABLET    Take 1 tablet by mouth 2 times daily (with meals)    RIMEGEPANT SULFATE 75 MG TBDP    Take 75 mg by mouth every other day    TRAZODONE (DESYREL) 50 MG TABLET    Take 1 tablet by mouth nightly as needed for Sleep    VALACYCLOVIR (VALTREX) 500 MG TABLET    Take 1 tablet by mouth daily         ALLERGIES     Latex and Prozac [fluoxetine]    FAMILYHISTORY       Family History   Problem Relation Age of Onset    Depression Mother     Anxiety Disorder Mother     Alcohol Abuse Mother     Schizophrenia Father     Bipolar Disorder Father     Suicide Attempts Father     Suicide Attempts Paternal Uncle           SOCIAL HISTORY       Social History     Tobacco Use    Smoking status: Every Day     Packs/day: 0.25     Types: Cigarettes, E-Cigarettes    Smokeless tobacco: Never   Vaping Use    Vaping Use: Every day    Substances: Nicotine   Substance Use Topics    Alcohol use: Yes     Comment: occ    Drug use: Yes     Types: Marijuana (Weed)     Comment: nightly for sleep       SCREENINGS    [place]ella Coma Scale  Eye Opening: Spontaneous  Best Verbal Response: Oriented  Best Motor Response: Obeys commands  [place] Coma Scale Score: 15        PHYSICAL EXAM    (up to 7 for level 4, 8 or more for level 5)     ED Triage Vitals [10/28/22 2053]   BP Temp Temp Source Heart Rate Resp SpO2 Height Weight   117/77 98.6 °F (37 °C) Oral 89 20 98 % 5' 2\" (1.575 m) 137 lb (62.1 kg)       Physical Exam  Vitals and nursing note reviewed. Exam conducted with a chaperone present (JESUS Otrega). Constitutional:       General: She is not in acute distress. Appearance: She is well-developed and normal weight. She is not ill-appearing. HENT:      Head: Normocephalic. Cardiovascular:      Rate and Rhythm: Normal rate and regular rhythm. Heart sounds: Normal heart sounds. Pulmonary:      Effort: Pulmonary effort is normal.      Breath sounds: Normal breath sounds. Abdominal:      General: Abdomen is flat. Bowel sounds are normal.      Palpations: Abdomen is soft. Tenderness: There is no abdominal tenderness. There is no right CVA tenderness or left CVA tenderness. Hernia: There is no hernia in the left inguinal area or right inguinal area. Genitourinary:     Exam position: Supine. Pubic Area: No rash or pubic lice. Labia:         Right: No rash, tenderness, lesion or injury. Left: No rash, tenderness, lesion or injury. Vagina: Normal.      Cervix: No cervical motion tenderness, discharge, friability, lesion, erythema, cervical bleeding or eversion.       Adnexa: Right adnexa normal and left adnexa normal.      Rectum: Normal.      Comments: Scant amount of dried blood noted around the cervix. Lymphadenopathy:      Lower Body: No right inguinal adenopathy. No left inguinal adenopathy. Neurological:      Mental Status: She is alert and oriented to person, place, and time. Psychiatric:         Mood and Affect: Mood normal.         Behavior: Behavior normal.       DIAGNOSTIC RESULTS   LABS:    Labs Reviewed   CBC WITH AUTO DIFFERENTIAL   COMPREHENSIVE METABOLIC PANEL W/ REFLEX TO MG FOR LOW K   HCG, SERUM, QUALITATIVE   URINALYSIS WITH REFLEX TO CULTURE       When ordered only abnormal lab results are displayed. All other labs were within normal range or not returned as of this dictation. EKG: When ordered, EKG's are interpreted by the Emergency Department Physician in the absence of a cardiologist.  Please see their note for interpretation of EKG. RADIOLOGY:   Non-plain film images such as CT, Ultrasound and MRI are read by the radiologist. Plain radiographic images are visualized and preliminarily interpreted by the ED Provider with the below findings:        Interpretation per the Radiologist below, if available at the time of this note:    No orders to display     No results found. PROCEDURES   Unless otherwise noted below, none     Procedures    CRITICAL CARE TIME       CONSULTS:  None      EMERGENCY DEPARTMENT COURSE and DIFFERENTIAL DIAGNOSIS/MDM:   Vitals:    Vitals:    10/28/22 2053   BP: 117/77   Pulse: 89   Resp: 20   Temp: 98.6 °F (37 °C)   TempSrc: Oral   SpO2: 98%   Weight: 137 lb (62.1 kg)   Height: 5' 2\" (1.575 m)       Patient was given the following medications:  Medications - No data to display      Is this patient to be included in the SEP-1 Core Measure due to severe sepsis or septic shock? No   Exclusion criteria - the patient is NOT to be included for SEP-1 Core Measure due to:   Infection is not suspected    80-year-old female presents emergency room with vaginal bleeding for the past week. Patient states that she saw her PCP couple days ago and was given orders for ultrasound and blood work. Patient states that she came here today to see if she can get the ultrasound and blood work. Advised her that the ultrasound is not available at this time but we can do the blood work. On exam patient appears well in no acute distress she is not having any significant abdominal pain. Pelvic exam was performed but did not see significant bleeding within the pelvic area. Patient's cervix appeared normal and did not have any cervical motion tenderness on exam.  Patient CBC and CMP were unremarkable. hCG testing was negative. Unable to obtain UA from patient and she is not having any dysuria or hematuria. Patient has been in the ED for serveral hours and doesn't not want to wait any longer. Patient be discharged at this time to follow-up with her primary care doctor and to obtain ultrasound as ordered. My suspicion is low for acute surgical abdomen, obstruction, perforation, abscess, mesenteric ischemia, AAA, dissection, cholecystitis, cholangitis, pancreatitis, appendicitis, C. difficile colitis, diverticulitis, volvulus, incarcerated hernia, necrotizing fasciitis, TOA, ovarian torsion, pelvic inflammatory disease, toxic shock syndrome, ectopic pregnancy, Rufino-Arvin Roland syndrome, help syndrome, preeclampsia, incarcerated hernia, Pramod's gangrene, pyelonephritis, perinephric abscess, kidney stone, urosepsis, fistula, intussusception, or other concerning pathology at this time. FINAL IMPRESSION      1. Vaginal bleeding    2.  Abdominal pain, unspecified abdominal location          DISPOSITION/PLAN   DISPOSITION Decision To Discharge 10/28/2022 10:40:50 PM      PATIENT REFERRED TO:  Toni Hansen DO  [address] [place] [address]  [phone]    Schedule an appointment as soon as possible for a visit in 1 week  [facility]  [phone]    As needed, If symptoms worsen    DISCHARGE MEDICATIONS:  New Prescriptions    No medications on file       DISCONTINUED MEDICATIONS:  Discontinued Medications    No medications on file              (Please note that portions of this note were completed with a voice recognition program.  Efforts were made to edit the dictations but occasionally words are mis-transcribed.)    Guinevere Libman, PA (electronically signed)            Guinevere Libman, [place]  10/28/22 [id number]

## 2022-10-29 NOTE — DISCHARGE INSTRUCTIONS
Follow up with your PCP next week for a recheck. Obtain Ultrasound as ordered by your primary care provider    Return to the ED if you have worsening bleeding, abdominal pain, nausea, vomiting, fever or chills.

## 2022-11-02 ENCOUNTER — OFFICE VISIT (OUTPATIENT)
Dept: GYNECOLOGY | Age: 22
End: 2022-11-02
Payer: MEDICAID

## 2022-11-02 VITALS
BODY MASS INDEX: 25.06 KG/M2 | DIASTOLIC BLOOD PRESSURE: 78 MMHG | SYSTOLIC BLOOD PRESSURE: 120 MMHG | HEART RATE: 87 BPM | OXYGEN SATURATION: 98 % | WEIGHT: 137 LBS

## 2022-11-02 DIAGNOSIS — N93.9 ABNORMAL UTERINE BLEEDING (AUB): Primary | ICD-10-CM

## 2022-11-02 DIAGNOSIS — N94.6 DYSMENORRHEA: ICD-10-CM

## 2022-11-02 PROCEDURE — 99203 OFFICE O/P NEW LOW 30 MIN: CPT | Performed by: OBSTETRICS & GYNECOLOGY

## 2022-11-02 NOTE — LETTER
[facility]  [address] [facility] [address]  Phone: [phone]  Fax: [phone]    Tonya Gao MD    November 2, 2022     Venu Mcdonald DO  [address]  [place] [address]    Patient: Sebastian Noyola   MR Number: [id number]   YOB: 2000   Date of Visit: 11/2/2022       Dear Venu Mcdonald:    Thank you for referring Luis Swift to me for evaluation/treatment. Below are the relevant portions of my assessment and plan of care. Roseline Che was seen today for evaluation of her abnormal bleeding. Her evaluation is in progress and I will let you know of any significant findings or treatment needed. If you have questions, please do not hesitate to call me. I look forward to following Roseline Che along with you.     Sincerely,      Tonya Gao MD

## 2022-11-02 NOTE — PROGRESS NOTES
Chief complaint: Establish Care      History of present illness: January Saldana 25 y.o. female Patient's last menstrual period was 10/19/2022. Who presents with complaint of abnormal bleeding. She reports that she has had abnormal bleeding for the last 2 weeks. She reports that normally her cycles are regular monthly and predictable. She reports that normally her cycles are not too bad. She reports that recently she has had 2 weeks of bleeding. This is been associated with pelvic cramping and lower back ache. She has been using Naprosyn which helps. She reports currently she only notices the abnormal bleeding when she wipes after bearing down. She is sexually active. Her birth control method is tubal ligation. She denies any disturbance of bowel or bladder function. The patient was seen by her primary care physician and testing was ordered. She has yet to complete this. She was seen the next day in the emergency department on October 28 and supplementary blood work was completed. Patient Active Problem List   Diagnosis    Generalized anxiety disorder with panic attacks    Primary insomnia    Current smoker    Chronic post-traumatic stress disorder    Moderate episode of recurrent major depressive disorder (HCC)    Borderline personality disorder ([facility] [address]    Social anxiety disorder    Chronic migraine without aura without status migrainosus, not intractable       Review of systems:  No complaints of symptoms involving:  Constitutional: negative for fever, chills. Eyes: No change in vision, double vision, or scotomata. HENT: No sore throat, ear pain or nasal congestion. Respiratory: No cough, shortness of breath or hemoptysis. Cardiovascular: No chest pain, orthopnea. The patient reports that she fainted and this is what prompted her ER visit on October 28. She reports fainting in her bed. She did not fall. She did not lose bowel or bladder.   When asked, the patient states that she has been feeling lightheaded at times. Skin: No pruritus or generalized rash. Neurologic: No focal weakness or sensory changes. Past medical history, past surgical history, allergies, family history, and social history are all updated in the electronic medical record and reviewed. Past Medical History:   Diagnosis Date    Bipolar 1 disorder ([facility] [address]      Past Surgical History:   Procedure Laterality Date     SECTION      TUBAL LIGATION       OB History          2    Para   2    Term   2            AB        Living   1         SAB        IAB        Ectopic        Molar        Multiple        Live Births   1              Current Outpatient Medications on File Prior to Visit   Medication Sig Dispense Refill    naproxen (NAPROSYN) 500 MG tablet Take 1 tablet by mouth 2 times daily (with meals) 30 tablet 0    Rimegepant Sulfate 75 MG TBDP Take 75 mg by mouth every other day 16 tablet 11    DULoxetine (CYMBALTA) 60 MG extended release capsule Take 1 capsule by mouth daily 30 capsule 1    valACYclovir (VALTREX) 500 MG tablet Take 1 tablet by mouth daily 30 tablet 11    traZODone (DESYREL) 50 MG tablet Take 1 tablet by mouth nightly as needed for Sleep 30 tablet 5     No current facility-administered medications on file prior to visit.      Allergy: Latex and Prozac [fluoxetine]  Social History     Tobacco Use    Smoking status: Every Day     Packs/day: 0.25     Types: Cigarettes, E-Cigarettes    Smokeless tobacco: Never   Substance Use Topics    Alcohol use: Yes     Comment: occ     Family History   Problem Relation Age of Onset    Depression Mother     Anxiety Disorder Mother     Alcohol Abuse Mother     Schizophrenia Father     Bipolar Disorder Father     Suicide Attempts Father     Suicide Attempts Paternal Uncle      Social History     Socioeconomic History    Marital status:      Spouse name: elena kitchen    Number of children: 2    Years of education: Not on file    Highest education level: Some college, no degree   Occupational History     Comment: [employer]   Tobacco Use    Smoking status: Every Day     Packs/day: 0.25     Types: Cigarettes, E-Cigarettes    Smokeless tobacco: Never   Vaping Use    Vaping Use: Every day    Substances: Nicotine   Substance and Sexual Activity    Alcohol use: Yes     Comment: occ    Drug use: Yes     Types: Marijuana [place] Cashing)     Comment: nightly for sleep    Sexual activity: Yes     Partners: Male     Birth control/protection: None   Other Topics Concern    Not on file   Social History Narrative    She lives with her ex , boyfriend and 2 kids. The patient is currently involved in a custody mcclure over her son. She just went back to school at [employer], will be looking at accounting/business. No guns at home, no  service for the patient, and no legal issues other than the custody case at this time. Social Determinants of Health     Financial Resource Strain: Not on file   Food Insecurity: Not on file   Transportation Needs: Not on file   Physical Activity: Not on file   Stress: Not on file   Social Connections: Not on file   Intimate Partner Violence: Not on file   Housing Stability: Not on file       Physical exam:  /78 (Site: Left Upper Arm, Position: Sitting, Cuff Size: Medium Adult)   Pulse 87   Wt 137 lb (62.1 kg)   LMP 10/19/2022   SpO2 98%   BMI 25.06 kg/m²  Body mass index is 25.06 kg/m². Patient's last menstrual period was 10/19/2022. Constitutional: alert, no acute distress, non-toxic, normal respiratory effort  Eyes: Sclera are clear and nonicteric. Noninjected. Lids are grossly normal.  Pupils are round equal.  Irises are grossly normal.  Mouth/ENT: Lips, teeth, gums grossly normal.  Psychiatric: Judgment and insight are intact. Mood and affect are appropriate, calm. Skin:  no lesions,no rashes. Numerous tattoos. Neck: Symmetric without gross mass effect. Trachea midline. Respiratory: Normal respiratory effort. No accessory muscles used. LABS:  Component      Latest Ref Rng & Units 9/21/2022           1:21 PM   Candida Parapsilosis      Not Detected Not Detected   Marsha spp      Not Detected Not Detected   Chlamydia Trachomatis By RT-PCR      Not Detected Not Detected   GARDNERELLA VAGINALIS      Not Detected Low (A)   Lactobacillus Spp. Not Detected Low (A)   Mycoplasma Genitalium PCR      Not Detected Not Detected   Mycoplasma hominis      Not Detected Not Detected   Neisseria Gonorrhoeae By RT-PCR      Not Detected Not Detected   TRICHOMONAS VAGINALIS      Not Detected Not Detected   Ureaplasma Urealyticum by PCR      Not Detected Detected (A)   Status post Flagyl and doxycycline            9:30 PM  9:30 PM  9:30 PM   WBC      4.0 - 11.0 K/uL   6.8   RBC      4.00 - 5.20 M/uL   4.85   Hemoglobin Quant      12.0 - 16.0 g/dL   14.2   Hematocrit      36.0 - 48.0 %   43.2   MCV      80.0 - 100.0 fL   89.2   MCH      26.0 - 34.0 pg   29.3   MCHC      31.0 - 36.0 g/dL   32.9   RDW      12.4 - 15.4 %   12.5   Platelet Count      904 - 450 K/uL   215     Component      Latest Ref Rng & Units 10/28/2022           9:30 PM   Sodium      136 - 145 mmol/L 141   Potassium      3.5 - 5.1 mmol/L 4.0   Chloride      99 - 110 mmol/L 106   CO2      21 - 32 mmol/L 27   Anion Gap      3 - 16 8   Glucose, Random      70 - 99 mg/dL 99   BUN,BUNPL      7 - 20 mg/dL 10   Creatinine      0.6 - 1.1 mg/dL 0.6   Est, Glom Filt Rate      >60 >60   CALCIUM, SERUM, 256017      8.3 - 10.6 mg/dL 10.1   Total Protein      6.4 - 8.2 g/dL 7.5   Albumin      3.4 - 5.0 g/dL 4.7   Albumin/Globulin Ratio      1.1 - 2.2 1.7   Bilirubin      0.0 - 1.0 mg/dL <0.2   Alk Phos      40 - 129 U/L 75   ALT      10 - 40 U/L 20   AST      15 - 37 U/L 21     Component      Latest Ref Rng & Units 10/28/2022           9:30 PM   hCG Qual      Detects HCG level >10 MIU/mL Negative         Imaging:  Images are reviewed.   CT OF THE ABDOMEN AND PELVIS WITHOUT CONTRAST 9/21/2022 4:04 pm       TECHNIQUE:   CT of the abdomen and pelvis was performed without the administration of   intravenous contrast. Multiplanar reformatted images are provided for review. Automated exposure control, iterative reconstruction, and/or weight based   adjustment of the mA/kV was utilized to reduce the radiation dose to as low   as reasonably achievable. COMPARISON:   None. HISTORY:   Acute generalized abdominal pain. FINDINGS:   Lower Chest: Unremarkable. Organs: Small left renal lesion is likely a cyst.  Remaining solid organs and   the gallbladder are unremarkable. GI/Bowel: Normal appendix. Remainder of the gastrointestinal tract is   unremarkable. Pelvis: Bladder and uterus are unremarkable. Tubal ligation clips. No   lymphadenopathy. Minimal free fluid is likely physiologic. Peritoneum/Retroperitoneum: No lymphadenopathy or ascites. Bones/Soft Tissues: Unremarkable. Impression   No acute abnormality. Diagnosis Orders   1. Abnormal uterine bleeding (AUB)  US PELVIS COMPLETE      2. Dysmenorrhea  US PELVIS COMPLETE          Differential diagnosis and management/testing options:  Abnormal bleeding. Associated with cramping and lower back discomfort. Bleeding is all but resolved at this point. Potential etiologies including hormonal abnormality and anatomical abnormalities such as endometrial polyps were reviewed with her and her significant other who accompanied her today. Further evaluation with previously ordered laboratory testing including TSH and prolactin. She is instructed on getting her blood work completed today. Pelvic ultrasound is ordered and she is instructed on getting this done. We discussed potential treatment options including combination hormone therapy and she is agreeable to this. We will wait test results prior to proceeding.         The level of care, extent of history, examination, laboratory review, imaging review, and complexity of care is based on the patient receiving specialty services. The extent and elements of her examination are indicated and appropriate in management. Image review, when appropriate, is done directly with the patient and her support person/family when present. This service is being reported inclusive to the evaluation and management services. Please note that some or all of this record was generated using voice recognition software. If there are any questions about the content of this document, please contact Dr. Abbie Rocha as some errors in transcription may have occurred.

## 2022-11-07 ENCOUNTER — HOSPITAL ENCOUNTER (OUTPATIENT)
Dept: ULTRASOUND IMAGING | Age: 22
Discharge: HOME | End: 2022-11-07
Payer: MEDICAID

## 2022-11-07 DIAGNOSIS — N93.9 ABNORMAL UTERINE BLEEDING (AUB): ICD-10-CM

## 2022-11-07 DIAGNOSIS — N94.6 DYSMENORRHEA: ICD-10-CM

## 2022-11-07 PROCEDURE — 76830 TRANSVAGINAL US NON-OB: CPT

## 2022-11-07 PROCEDURE — 76856 US EXAM PELVIC COMPLETE: CPT

## 2022-11-16 ENCOUNTER — OFFICE VISIT (OUTPATIENT)
Dept: PRIMARY CARE CLINIC | Age: 22
End: 2022-11-16
Payer: MEDICAID

## 2022-11-16 VITALS
HEIGHT: 62 IN | HEART RATE: 98 BPM | SYSTOLIC BLOOD PRESSURE: 118 MMHG | BODY MASS INDEX: 25.21 KG/M2 | TEMPERATURE: 97 F | DIASTOLIC BLOOD PRESSURE: 85 MMHG | WEIGHT: 137 LBS

## 2022-11-16 DIAGNOSIS — R10.2 PELVIC PAIN: Primary | ICD-10-CM

## 2022-11-16 DIAGNOSIS — Z12.4 CERVICAL CANCER SCREENING: ICD-10-CM

## 2022-11-16 DIAGNOSIS — B37.9 YEAST INFECTION: ICD-10-CM

## 2022-11-16 DIAGNOSIS — G43.709 CHRONIC MIGRAINE WITHOUT AURA WITHOUT STATUS MIGRAINOSUS, NOT INTRACTABLE: ICD-10-CM

## 2022-11-16 PROCEDURE — 99215 OFFICE O/P EST HI 40 MIN: CPT | Performed by: STUDENT IN AN ORGANIZED HEALTH CARE EDUCATION/TRAINING PROGRAM

## 2022-11-16 RX ORDER — FLUCONAZOLE 150 MG/1
150 TABLET ORAL ONCE
Qty: 1 TABLET | Refills: 0 | Status: SHIPPED | OUTPATIENT
Start: 2022-11-16 | End: 2022-11-16

## 2022-11-16 ASSESSMENT — ENCOUNTER SYMPTOMS
NAUSEA: 0
ABDOMINAL PAIN: 0
BACK PAIN: 1

## 2022-11-16 NOTE — PATIENT INSTRUCTIONS
Please schedule an appointment with Dr. Constantino Cross (psychologist) for therapy at the  on your way out      Call to schedule appointment with OB/GYN (female):  [address], [address], [place]a [place]ares, [facility]   Phone: [phone]

## 2022-11-21 ENCOUNTER — TELEPHONE (OUTPATIENT)
Dept: PRIMARY CARE CLINIC | Age: 22
End: 2022-11-21

## 2022-11-21 NOTE — TELEPHONE ENCOUNTER
Rimegepant Sulfate 75 MG TBDP [[id number]]     Order Details  Dose: 75 mg Route: Oral Frequency: EVERY OTHER DAY   Dispense Quantity: 16 tablet Refills: 11          Sig: Take 75 mg by mouth every other day         Start Date: 11/16/22 End Date: --   Written Date: 11/16/22 Expiration Date: 11/16/23       Diagnosis Association: Chronic migraine without aura without status migrainosus, not intractable (G43.709)   Original Order:  Rimegepant Sulfate 75 MG TBDP [[id number]]   Providers    Authorizing Provider: Jorden Robles DO NPI: [id number]   Ordering User:  Jorden Robles, [address] (New Address) - [address] [phone] - F [phone]   [address] [place] [address] [address] [place] [address], [facility] [place] [address]   Phone:  [phone]  Fax:  [phone]     Order Class:    Order Class   Normal [1]     Warnings Override History    No Interaction Warnings Shown      Rimegepant Sulfate 75 MG TBDP  Date: 11/16/2022 Department: [facility] Ordering/Authorizing: Jorden Robles DO     Outpatient Medication Detail     Disp Refills Start End    Rimegepant Sulfate 75 MG TBDP 16 tablet 11 11/16/2022     Sig - Route:  Take 75 mg by mouth every other day - Oral    Sent to pharmacy as: Rimegepant Sulfate 75 MG Oral Tablet Disintegrating    E-Prescribing Status: Receipt confirmed by pharmacy (11/16/2022  2:52 PM EST)

## 2022-12-16 NOTE — TELEPHONE ENCOUNTER
[employer] sent a letter stating that they don't handle patient's PAs. PA resubmitted to [place]. Submitted PA for Nurtec 75MG dispersible tablets  Via CMM Key: [id number] STATUS: DENIED; Denial letter attached. If this requires a response please respond to the pool ( P [facility] [address]). Thank you please advise patient.

## 2022-12-25 NOTE — PROGRESS NOTES
PSYCHIATRY PROGRESS NOTE    Johnson   2000 12/27/22  Face to Face time: 30 minutes  PCP: Sherly Cronin DO    CC:   Chief Complaint   Patient presents with    Follow-up    Anxiety     Patient is a 18yo female without significant PMH who presents to the outpatient psychiatric clinic today for evaluation and management of depression and anxiety. A:  Patient's presentation today is indicative of continued difficulties with anxiety, though her depression appears to be improving slightly. We will attempt to adjust her regimen to provide ongoing support. Diagnosis:  Generalized anxiety disorder panic attacks  Posttraumatic stress disorder  Major depressive disorder, recurrent moderate  Social anxiety disorder, very severe  Borderline personality disorder    P:   We will plan to maintain the patient on her current duloxetine 60mg daily and trazodone 50mg nightly  We will plan to add prazosin 1mg nightly for 7d, followed by an increase to 2mg nightly thereafter. Patient was counseled regarding adverse effects of this medication including orthostatic changes, advised to maintain adequate hydration and to rise from sleep in a slow and careful manner. Medication Monitoring:    - PDMP reviewed: No current prescriptions     Follow-up: 4 weeks    Safety: Pt was counseled on the potential for increased suicidal ideations and advised on potential options for dealing with these including hotlines, calling the office, or going to the nearest emergency room. __________________________________________________________________________    S:   Patient indicated that she initially felt that the medications were not helping her. She noted that she still has difficulties at times with binge sleeping, sleeping for 10 to 14 hours at a time and even sleeping through alarms. She is noting that she is using marijuana in order to get to sleep because she has difficulties with onset at other times.   Patient identified still feeling anxious, and this tends to lead to her feeling overwhelmed quite easily and lashing out with some irritability a lot more so than she wants to. She does note that her boyfriend and her family are still helping. Patient denied any SI/HI/AVH on evaluation today. ROS:  Review of Systems   Constitutional:  Positive for fatigue. HENT: Negative. Eyes: Negative. Respiratory: Negative. Cardiovascular: Negative. Gastrointestinal: Negative. Endocrine: Negative. Genitourinary: Negative. Musculoskeletal: Negative. Skin: Negative. Allergic/Immunologic: Negative. Neurological: Negative. Hematological: Negative. Psychiatric/Behavioral:  Positive for dysphoric mood and sleep disturbance. The patient is nervous/anxious. Brief Medical Hx:   Patient Active Problem List   Diagnosis    Generalized anxiety disorder with panic attacks    Primary insomnia    Current smoker    Chronic post-traumatic stress disorder    Moderate episode of recurrent major depressive disorder (HCC)    Borderline personality disorder ([facility] [address]    Social anxiety disorder    Chronic migraine without aura without status migrainosus, not intractable    Pelvic pain        Brief Psych Hx:  Hosp: 2x in 2015 after suicide attempts as well as a 6mo stay in a treatment facility as a teenager  Diagnoses: Bipolar disorder, depression, anxiety  Med trials: fluoxetine, lithium, sertraline (suicidal), cariprazine  Outpt: Previous psychiatrists since age 15, has a history of therapy/counseling since that time as well  NSSI: History of cutting, last done about 1mo ago.   Tops of thighs and on her arms  Suicide Attempts: 2x in 2015 when she attempted to overdose on Trazodone       O:  Wt Readings from Last 3 Encounters:   12/27/22 136 lb (61.7 kg)   11/16/22 137 lb (62.1 kg)   11/02/22 137 lb (62.1 kg)       Vitals:    12/27/22 1543   BP: 118/84   Pulse: 93   Weight: 136 lb (61.7 kg)       Mental Status Exam:   Appearance:    Appropriately dressed  Motor: No abnormal movements, tics or mannerisms. Eye Contact: Good  Speech:    Appropriate rate and rhythm  Language:   Appropriate diction  Mood/Affect:  \" Not great\"/some blunting of affect apparent  Thought Process:   Linear, logical  Thought Content:    Some depressive and anxious content present, no SI/HI  Hallucinations:   Denied, not seem to be responding to internal stimuli  Associations:   Intact  Attention/Concentration:   Intact  Orientation:    Alert and oriented x4  Memory:   Intact  Fund of Knowledge:    Appropriate for age and education  Insight/Judgement:   Intact/intact      PHQ-9 Questionaire 12/27/2022 9/20/2022   Little interest or pleasure in doing things 1 2   Feeling down, depressed, or hopeless 1 2   Trouble falling or staying asleep, or sleeping too much 3 3   Feeling tired or having little energy 3 3   Poor appetite or overeating 3 3   Feeling bad about yourself - or that you are a failure or have let yourself or your family down 0 1   Trouble concentrating on things, such as reading the newspaper or watching television 1 3   Moving or speaking so slowly that other people could have noticed. Or the opposite - being so fidgety or restless that you have been moving around a lot more than usual 2 2   Thoughts that you would be better off dead, or of hurting yourself in some way 0 0   PHQ-9 Total Score 14 19   If you checked off any problems, how difficult have these problems made it for you to do your work, take care of things at home, or get along with other people?  3 3     OWEN-7 SCREENING 12/27/2022 9/20/2022   Feeling nervous, anxious, or on edge Nearly every day Nearly every day   Not being able to stop or control worrying More than half the days Nearly every day   Worrying too much about different things More than half the days Nearly every day   Trouble relaxing Nearly every day Nearly every day   Being so restless that it is hard to sit still More than half the days Nearly every day   Becoming easily annoyed or irritable More than half the days Nearly every day   Feeling afraid as if something awful might happen Not at all More than half the days   OWEN-7 Total Score 14 20   How difficult have these problems made it for you to do your work, take care of things at home, or get along with other people?  - Extremely difficult   Feeling nervous, anxious, or on edge - -   Not able to stop or control worrying - -   Worrying too much about different things - -   Trouble relaxing - -   Being so restless that it's hard to sit still - -   Becoming easily annoyed or irritable - -   Feeling afraid as if something awful might happen - -   OWEN-7 Total Score - -        Labs:     Admission on 10/28/2022, Discharged on 10/28/2022   Component Date Value Ref Range Status    Ventricular Rate 10/28/2022 89  BPM Final    Atrial Rate 10/28/2022 89  BPM Final    P-R Interval 10/28/2022 150  ms Final    QRS Duration 10/28/2022 86  ms Final    Q-T Interval 10/28/2022 322  ms Final    QTc Calculation (Bazett) 10/28/2022 391  ms Final    P Axis 10/28/2022 61  degrees Final    R Axis 10/28/2022 79  degrees Final    T Axis 10/28/2022 26  degrees Final    Diagnosis 10/28/2022 Sinus rhythm with marked sinus arrhythmiaPossible Left atrial enlargementBorderline ECGConfirmed by Mona Wilkes ([id number]) on 10/29/2022 8:05:42 AM   Final    WBC 10/28/2022 6.8  4.0 - 11.0 K/uL Final    RBC 10/28/2022 4.85  4.00 - 5.20 M/uL Final    Hemoglobin 10/28/2022 14.2  12.0 - 16.0 g/dL Final    Hematocrit 10/28/2022 43.2  36.0 - 48.0 % Final    MCV 10/28/2022 89.2  80.0 - 100.0 fL Final    MCH 10/28/2022 29.3  26.0 - 34.0 pg Final    MCHC 10/28/2022 32.9  31.0 - 36.0 g/dL Final    RDW 10/28/2022 12.5  12.4 - 15.4 % Final    Platelets 58/13/8439 215  135 - 450 K/uL Final    MPV 10/28/2022 9.4  5.0 - 10.5 fL Final    Neutrophils % 10/28/2022 66.9  % Final    Lymphocytes % 10/28/2022 23.3  % Final Monocytes % 10/28/2022 9.0  % Final    Eosinophils % 10/28/2022 0.3  % Final    Basophils % 10/28/2022 0.5  % Final    Neutrophils Absolute 10/28/2022 4.5  1.7 - 7.7 K/uL Final    Lymphocytes Absolute 10/28/2022 1.6  1.0 - 5.1 K/uL Final    Monocytes Absolute 10/28/2022 0.6  0.0 - 1.3 K/uL Final    Eosinophils Absolute 10/28/2022 0.0  0.0 - 0.6 K/uL Final    Basophils Absolute 10/28/2022 0.0  0.0 - 0.2 K/uL Final    Sodium 10/28/2022 141  136 - 145 mmol/L Final    Potassium reflex Magnesium 10/28/2022 4.0  3.5 - 5.1 mmol/L Final    Chloride 10/28/2022 106  99 - 110 mmol/L Final    CO2 10/28/2022 27  21 - 32 mmol/L Final    Anion Gap 10/28/2022 8  3 - 16 Final    Glucose 10/28/2022 99  70 - 99 mg/dL Final    BUN 10/28/2022 10  7 - 20 mg/dL Final    Creatinine 10/28/2022 0.6  0.6 - 1.1 mg/dL Final    Est, Glom Filt Rate 10/28/2022 >60  >60 Final    Comment: Pediatric calculator link  [place].at. org/professionals/kdoqi/gfr_calculatorped  Effective Oct 3, 2022  These results are not intended for use in patients  <25years of age. eGFR results are calculated without  a race factor using the 2021 CKD-EPI equation. Careful  clinical correlation is recommended, particularly when  comparing to results calculated using previous equations. The CKD-EPI equation is less accurate in patients with  extremes of muscle mass, extra-renal metabolism of  creatinine, excessive creatinine ingestion, or following  therapy that affects renal tubular secretion.       Calcium 10/28/2022 10.1  8.3 - 10.6 mg/dL Final    Total Protein 10/28/2022 7.5  6.4 - 8.2 g/dL Final    Albumin 10/28/2022 4.7  3.4 - 5.0 g/dL Final    Albumin/Globulin Ratio 10/28/2022 1.7  1.1 - 2.2 Final    Total Bilirubin 10/28/2022 <0.2  0.0 - 1.0 mg/dL Final    Alkaline Phosphatase 10/28/2022 75  40 - 129 U/L Final    ALT 10/28/2022 20  10 - 40 U/L Final    AST 10/28/2022 21  15 - 37 U/L Final    hCG Qual 10/28/2022 Negative  Detects HCG level >10 MIU/mL Final EKG: None on file        Blaise Martinez MD  Psychiatrist

## 2022-12-27 ENCOUNTER — OFFICE VISIT (OUTPATIENT)
Dept: PSYCHIATRY | Age: 22
End: 2022-12-27
Payer: MEDICAID

## 2022-12-27 ENCOUNTER — TELEPHONE (OUTPATIENT)
Dept: PRIMARY CARE CLINIC | Age: 22
End: 2022-12-27

## 2022-12-27 VITALS
HEART RATE: 93 BPM | SYSTOLIC BLOOD PRESSURE: 118 MMHG | DIASTOLIC BLOOD PRESSURE: 84 MMHG | BODY MASS INDEX: 24.87 KG/M2 | WEIGHT: 136 LBS

## 2022-12-27 DIAGNOSIS — F33.1 MODERATE EPISODE OF RECURRENT MAJOR DEPRESSIVE DISORDER (HCC): ICD-10-CM

## 2022-12-27 DIAGNOSIS — F41.1 GENERALIZED ANXIETY DISORDER WITH PANIC ATTACKS: ICD-10-CM

## 2022-12-27 DIAGNOSIS — F41.0 GENERALIZED ANXIETY DISORDER WITH PANIC ATTACKS: ICD-10-CM

## 2022-12-27 DIAGNOSIS — F43.12 CHRONIC POST-TRAUMATIC STRESS DISORDER: ICD-10-CM

## 2022-12-27 DIAGNOSIS — F51.01 PRIMARY INSOMNIA: ICD-10-CM

## 2022-12-27 PROCEDURE — 99214 OFFICE O/P EST MOD 30 MIN: CPT | Performed by: STUDENT IN AN ORGANIZED HEALTH CARE EDUCATION/TRAINING PROGRAM

## 2022-12-27 RX ORDER — PRAZOSIN HYDROCHLORIDE 1 MG/1
CAPSULE ORAL
Qty: 67 CAPSULE | Refills: 0 | Status: SHIPPED | OUTPATIENT
Start: 2022-12-27 | End: 2023-02-02

## 2022-12-27 RX ORDER — TRAZODONE HYDROCHLORIDE 50 MG/1
50 TABLET ORAL NIGHTLY PRN
Qty: 30 TABLET | Refills: 5 | Status: SHIPPED | OUTPATIENT
Start: 2022-12-27

## 2022-12-27 RX ORDER — DULOXETIN HYDROCHLORIDE 60 MG/1
60 CAPSULE, DELAYED RELEASE ORAL DAILY
Qty: 30 CAPSULE | Refills: 2 | Status: SHIPPED | OUTPATIENT
Start: 2022-12-27

## 2022-12-27 ASSESSMENT — PATIENT HEALTH QUESTIONNAIRE - PHQ9
8. MOVING OR SPEAKING SO SLOWLY THAT OTHER PEOPLE COULD HAVE NOTICED. OR THE OPPOSITE, BEING SO FIGETY OR RESTLESS THAT YOU HAVE BEEN MOVING AROUND A LOT MORE THAN USUAL: 2
3. TROUBLE FALLING OR STAYING ASLEEP: 3
2. FEELING DOWN, DEPRESSED OR HOPELESS: 1
9. THOUGHTS THAT YOU WOULD BE BETTER OFF DEAD, OR OF HURTING YOURSELF: 0
SUM OF ALL RESPONSES TO PHQ QUESTIONS 1-9: 14
4. FEELING TIRED OR HAVING LITTLE ENERGY: 3
SUM OF ALL RESPONSES TO PHQ QUESTIONS 1-9: 14
10. IF YOU CHECKED OFF ANY PROBLEMS, HOW DIFFICULT HAVE THESE PROBLEMS MADE IT FOR YOU TO DO YOUR WORK, TAKE CARE OF THINGS AT HOME, OR GET ALONG WITH OTHER PEOPLE: 3
7. TROUBLE CONCENTRATING ON THINGS, SUCH AS READING THE NEWSPAPER OR WATCHING TELEVISION: 1
1. LITTLE INTEREST OR PLEASURE IN DOING THINGS: 1
SUM OF ALL RESPONSES TO PHQ QUESTIONS 1-9: 14
5. POOR APPETITE OR OVEREATING: 3
6. FEELING BAD ABOUT YOURSELF - OR THAT YOU ARE A FAILURE OR HAVE LET YOURSELF OR YOUR FAMILY DOWN: 0
SUM OF ALL RESPONSES TO PHQ QUESTIONS 1-9: 14
SUM OF ALL RESPONSES TO PHQ9 QUESTIONS 1 & 2: 2

## 2022-12-27 ASSESSMENT — ANXIETY QUESTIONNAIRES
3. WORRYING TOO MUCH ABOUT DIFFERENT THINGS: 2
1. FEELING NERVOUS, ANXIOUS, OR ON EDGE: 3
7. FEELING AFRAID AS IF SOMETHING AWFUL MIGHT HAPPEN: 0
2. NOT BEING ABLE TO STOP OR CONTROL WORRYING: 2
GAD7 TOTAL SCORE: 14
5. BEING SO RESTLESS THAT IT IS HARD TO SIT STILL: 2
4. TROUBLE RELAXING: 3
6. BECOMING EASILY ANNOYED OR IRRITABLE: 2

## 2022-12-27 NOTE — TELEPHONE ENCOUNTER
----- Message from Ester Jalloh sent at 12/23/2022 11:36 AM EST -----  Subject: Medication Problem    Medication: Rimegepant Sulfate 75 MG TBDP  Dosage: Has never started it. Ordering Provider: fawad    Question/Problem: Jocy Lazcano states that she received a letter   yesterday stating that her insurance will not cover this prescription. Please f/u to advise patient. It is for her migraines.        Pharmacy: [address] [place] [address],[address] [facility] [phone] Mary Reese [phone]    ---------------------------------------------------------------------------  --------------  Kathleen DELEON  [phone]; OK to leave message on voicemail, OK to respond with   electronic message via [employer] portal (only for patients who have   registered [employer] account)  ---------------------------------------------------------------------------  --------------    SCRIPT ANSWERS  Relationship to Patient: Self

## 2022-12-29 ASSESSMENT — ENCOUNTER SYMPTOMS
ALLERGIC/IMMUNOLOGIC NEGATIVE: 1
RESPIRATORY NEGATIVE: 1
GASTROINTESTINAL NEGATIVE: 1
EYES NEGATIVE: 1

## 2023-01-20 NOTE — PROGRESS NOTES
PSYCHIATRY PROGRESS NOTE    Sandeep Rao  2000 01/24/23  Face to Face time: 30 minutes  PCP: Tai Acosta DO    CC:   Chief Complaint   Patient presents with    Follow-up    Anxiety     Patient is a 18yo female without significant PMH who presents to the outpatient psychiatric clinic today for evaluation and management of depression and anxiety. Sandeep Rao, was evaluated through a synchronous (real-time) audio-video encounter. The patient (or guardian if applicable) is aware that this is a billable service, which includes applicable co-pays. This Virtual Visit was conducted with patient's (and/or legal guardian's) consent. The visit was conducted pursuant to the emergency declaration under the [address] [place] [address], [address] [place] [address] authority and the [employer] Act. Patient identification was verified, and a caregiver was present when appropriate. The patient was located at Home: [place] [address] [address] [id number]. Provider was located at [address]): [address] [place] [address]. [address] [place] [address]. A:  Patient's presentation today is indicative of marked improvement with the addition of the prazosin as far as her PTSD symptoms are concerned. In addition, the patient has increased her duloxetine up to the 60mg dosage and found that to be beneficial, allowing her to actually be awake during the daytime. We will continue to provide ongoing support. Diagnosis:  Generalized anxiety disorder panic attacks  Posttraumatic stress disorder  Major depressive disorder, recurrent moderate  Social anxiety disorder, very severe  Borderline personality disorder    P:   No changes to what she's currently taking. Continue prazosin 2mg nightly, duloxetine 60mg daily, and trazodone 50mg nightly.     Medication Monitoring:    - PDMP reviewed: No interval change     Follow-up: 2 months    Safety: Pt was counseled on the potential for increased suicidal ideations and advised on potential options for dealing with these including hotlines, calling the office, or going to the nearest emergency room. __________________________________________________________________________    S:   The patient endorsed that the current medications are working much better for her. She's now sleeping through the night, actually getting only 9 hours of sleep (down from 16+) nightly with the prazosin and trazodone. Had to wait until the 2mg dose of the prazosin, however when this kicked in she saw a huge difference. Some dizziness noted with the increase in medication. The patient has also indicated that she's eating much better and is doing a lot less late night snacking. As a result, she's lost weight, down to 124lbs. No NM, no flashbacks. Her ex- has moved out, leaving her feeling like things are calming down. That is, until her dog had puppies this past week. That said, the patient is now awake and alert, able to help out with her family and the puppies alike. The patient denied any SI/HI/AVH on evaluation. ROS:  Review of Systems   Constitutional: Negative. HENT: Negative. Eyes: Negative. Respiratory: Negative. Cardiovascular: Negative. Gastrointestinal: Negative. Endocrine: Negative. Genitourinary: Negative. Musculoskeletal: Negative. Skin: Negative. Allergic/Immunologic: Negative. Neurological:  Positive for dizziness. Hematological: Negative. Psychiatric/Behavioral:  The patient is nervous/anxious.        Brief Medical Hx:   Patient Active Problem List   Diagnosis    Generalized anxiety disorder with panic attacks    Primary insomnia    Current smoker    Chronic post-traumatic stress disorder    Moderate episode of recurrent major depressive disorder (HCC)    Borderline personality disorder ([facility] [address]    Social anxiety disorder    Chronic migraine without aura without status migrainosus, not intractable    Pelvic pain        Brief Psych Hx:  Hosp: 2x in 2015 after suicide attempts as well as a 6mo stay in a treatment facility as a teenager  Diagnoses: Bipolar disorder, depression, anxiety  Med trials: fluoxetine, lithium, sertraline (suicidal), cariprazine  Outpt: Previous psychiatrists since age 15, has a history of therapy/counseling since that time as well  NSSI: History of cutting, last done about 1mo ago. Tops of thighs and on her arms  Suicide Attempts: 2x in 2015 when she attempted to overdose on Trazodone       O:  Wt Readings from Last 3 Encounters:   12/27/22 136 lb (61.7 kg)   11/16/22 137 lb (62.1 kg)   11/02/22 137 lb (62.1 kg)       There were no vitals filed for this visit. Limited due to video visit. Mental Status Exam:   Appearance:    Appropriately dressed  Motor: No abnormal movements, tics or mannerisms. Eye Contact: Good for video  Speech:    Appropriate rate and rhythm  Language:   Appropriate diction  Mood/Affect:  \"So much better\"/ Alert and euthymic  Thought Process:   Linear, logical  Thought Content:    Topic-appropriate, no SI/HI  Hallucinations:   Denied, not seen to be responding to IS  Associations:   Intact  Attention/Concentration:   Intact  Orientation:    Alert and oriented x4  Memory:   Intact  Fund of Knowledge:    Appropriate for age and education  Insight/Judgement:   Intact/intact      PHQ-9 Questionaire 1/24/2023 12/27/2022   Little interest or pleasure in doing things 0 1   Feeling down, depressed, or hopeless 0 1   Trouble falling or staying asleep, or sleeping too much 1 3   Feeling tired or having little energy 0 3   Poor appetite or overeating 0 3   Feeling bad about yourself - or that you are a failure or have let yourself or your family down 0 0   Trouble concentrating on things, such as reading the newspaper or watching television 1 1   Moving or speaking so slowly that other people could have noticed.  Or the opposite - being so fidgety or restless that you have been moving around a lot more than usual 0 2   Thoughts that you would be better off dead, or of hurting yourself in some way 0 0   PHQ-9 Total Score 2 14   If you checked off any problems, how difficult have these problems made it for you to do your work, take care of things at home, or get along with other people? 2 3     OWEN-7 SCREENING 1/24/2023 12/27/2022   Feeling nervous, anxious, or on edge More than half the days Nearly every day   Not being able to stop or control worrying Not at all More than half the days   Worrying too much about different things Not at all More than half the days   Trouble relaxing Several days Nearly every day   Being so restless that it is hard to sit still Several days More than half the days   Becoming easily annoyed or irritable Several days More than half the days   Feeling afraid as if something awful might happen Not at all Not at all   OWEN-7 Total Score 5 14   How difficult have these problems made it for you to do your work, take care of things at home, or get along with other people?  Somewhat difficult -   Feeling nervous, anxious, or on edge - -   Not able to stop or control worrying - -   Worrying too much about different things - -   Trouble relaxing - -   Being so restless that it's hard to sit still - -   Becoming easily annoyed or irritable - -   Feeling afraid as if something awful might happen - -   OWEN-7 Total Score - -        Labs:     Admission on 10/28/2022, Discharged on 10/28/2022   Component Date Value Ref Range Status    Ventricular Rate 10/28/2022 89  BPM Final    Atrial Rate 10/28/2022 89  BPM Final    P-R Interval 10/28/2022 150  ms Final    QRS Duration 10/28/2022 86  ms Final    Q-T Interval 10/28/2022 322  ms Final    QTc Calculation (Bazett) 10/28/2022 391  ms Final    P Axis 10/28/2022 61  degrees Final    R Axis 10/28/2022 79  degrees Final    T Axis 10/28/2022 26  degrees Final    Diagnosis 10/28/2022 Sinus rhythm with marked sinus arrhythmiaPossible Left atrial enlargementBorderline ECGConfirmed by David Lin ([id number]) on 10/29/2022 8:05:42 AM   Final    WBC 10/28/2022 6.8  4.0 - 11.0 K/uL Final    RBC 10/28/2022 4.85  4.00 - 5.20 M/uL Final    Hemoglobin 10/28/2022 14.2  12.0 - 16.0 g/dL Final    Hematocrit 10/28/2022 43.2  36.0 - 48.0 % Final    MCV 10/28/2022 89.2  80.0 - 100.0 fL Final    MCH 10/28/2022 29.3  26.0 - 34.0 pg Final    MCHC 10/28/2022 32.9  31.0 - 36.0 g/dL Final    RDW 10/28/2022 12.5  12.4 - 15.4 % Final    Platelets 03/16/2506 215  135 - 450 K/uL Final    MPV 10/28/2022 9.4  5.0 - 10.5 fL Final    Neutrophils % 10/28/2022 66.9  % Final    Lymphocytes % 10/28/2022 23.3  % Final    Monocytes % 10/28/2022 9.0  % Final    Eosinophils % 10/28/2022 0.3  % Final    Basophils % 10/28/2022 0.5  % Final    Neutrophils Absolute 10/28/2022 4.5  1.7 - 7.7 K/uL Final    Lymphocytes Absolute 10/28/2022 1.6  1.0 - 5.1 K/uL Final    Monocytes Absolute 10/28/2022 0.6  0.0 - 1.3 K/uL Final    Eosinophils Absolute 10/28/2022 0.0  0.0 - 0.6 K/uL Final    Basophils Absolute 10/28/2022 0.0  0.0 - 0.2 K/uL Final    Sodium 10/28/2022 141  136 - 145 mmol/L Final    Potassium reflex Magnesium 10/28/2022 4.0  3.5 - 5.1 mmol/L Final    Chloride 10/28/2022 106  99 - 110 mmol/L Final    CO2 10/28/2022 27  21 - 32 mmol/L Final    Anion Gap 10/28/2022 8  3 - 16 Final    Glucose 10/28/2022 99  70 - 99 mg/dL Final    BUN 10/28/2022 10  7 - 20 mg/dL Final    Creatinine 10/28/2022 0.6  0.6 - 1.1 mg/dL Final    Est, Glom Filt Rate 10/28/2022 >60  >60 Final    Comment: Pediatric calculator link  [place].at. org/professionals/kdoqi/gfr_calculatorped  Effective Oct 3, 2022  These results are not intended for use in patients  <25years of age. eGFR results are calculated without  a race factor using the 2021 CKD-EPI equation.   Careful  clinical correlation is recommended, particularly when  comparing to results calculated using previous equations. The CKD-EPI equation is less accurate in patients with  extremes of muscle mass, extra-renal metabolism of  creatinine, excessive creatinine ingestion, or following  therapy that affects renal tubular secretion.       Calcium 10/28/2022 10.1  8.3 - 10.6 mg/dL Final    Total Protein 10/28/2022 7.5  6.4 - 8.2 g/dL Final    Albumin 10/28/2022 4.7  3.4 - 5.0 g/dL Final    Albumin/Globulin Ratio 10/28/2022 1.7  1.1 - 2.2 Final    Total Bilirubin 10/28/2022 <0.2  0.0 - 1.0 mg/dL Final    Alkaline Phosphatase 10/28/2022 75  40 - 129 U/L Final    ALT 10/28/2022 20  10 - 40 U/L Final    AST 10/28/2022 21  15 - 37 U/L Final    hCG Qual 10/28/2022 Negative  Detects HCG level >10 MIU/mL Final       EKG: None on file        Srinivasa Breen MD  Psychiatrist

## 2023-01-24 ENCOUNTER — TELEMEDICINE (OUTPATIENT)
Dept: PSYCHIATRY | Age: 23
End: 2023-01-24
Payer: MEDICAID

## 2023-01-24 DIAGNOSIS — F41.0 GENERALIZED ANXIETY DISORDER WITH PANIC ATTACKS: ICD-10-CM

## 2023-01-24 DIAGNOSIS — F41.1 GENERALIZED ANXIETY DISORDER WITH PANIC ATTACKS: ICD-10-CM

## 2023-01-24 DIAGNOSIS — F43.12 CHRONIC POST-TRAUMATIC STRESS DISORDER: ICD-10-CM

## 2023-01-24 DIAGNOSIS — F33.1 MODERATE EPISODE OF RECURRENT MAJOR DEPRESSIVE DISORDER (HCC): ICD-10-CM

## 2023-01-24 PROCEDURE — 99214 OFFICE O/P EST MOD 30 MIN: CPT | Performed by: STUDENT IN AN ORGANIZED HEALTH CARE EDUCATION/TRAINING PROGRAM

## 2023-01-24 RX ORDER — DULOXETIN HYDROCHLORIDE 60 MG/1
60 CAPSULE, DELAYED RELEASE ORAL DAILY
Qty: 30 CAPSULE | Refills: 2 | Status: SHIPPED | OUTPATIENT
Start: 2023-01-24

## 2023-01-24 RX ORDER — PRAZOSIN HYDROCHLORIDE 2 MG/1
2 CAPSULE ORAL NIGHTLY
Qty: 30 CAPSULE | Refills: 2 | Status: SHIPPED | OUTPATIENT
Start: 2023-01-24

## 2023-01-24 ASSESSMENT — PATIENT HEALTH QUESTIONNAIRE - PHQ9
SUM OF ALL RESPONSES TO PHQ QUESTIONS 1-9: 2
10. IF YOU CHECKED OFF ANY PROBLEMS, HOW DIFFICULT HAVE THESE PROBLEMS MADE IT FOR YOU TO DO YOUR WORK, TAKE CARE OF THINGS AT HOME, OR GET ALONG WITH OTHER PEOPLE: 2
3. TROUBLE FALLING OR STAYING ASLEEP: 1
5. POOR APPETITE OR OVEREATING: 0
SUM OF ALL RESPONSES TO PHQ QUESTIONS 1-9: 2
4. FEELING TIRED OR HAVING LITTLE ENERGY: 0
SUM OF ALL RESPONSES TO PHQ QUESTIONS 1-9: 2
9. THOUGHTS THAT YOU WOULD BE BETTER OFF DEAD, OR OF HURTING YOURSELF: 0
SUM OF ALL RESPONSES TO PHQ QUESTIONS 1-9: 2
7. TROUBLE CONCENTRATING ON THINGS, SUCH AS READING THE NEWSPAPER OR WATCHING TELEVISION: 1
6. FEELING BAD ABOUT YOURSELF - OR THAT YOU ARE A FAILURE OR HAVE LET YOURSELF OR YOUR FAMILY DOWN: 0
2. FEELING DOWN, DEPRESSED OR HOPELESS: 0
8. MOVING OR SPEAKING SO SLOWLY THAT OTHER PEOPLE COULD HAVE NOTICED. OR THE OPPOSITE, BEING SO FIGETY OR RESTLESS THAT YOU HAVE BEEN MOVING AROUND A LOT MORE THAN USUAL: 0
1. LITTLE INTEREST OR PLEASURE IN DOING THINGS: 0
SUM OF ALL RESPONSES TO PHQ9 QUESTIONS 1 & 2: 0

## 2023-01-24 ASSESSMENT — ANXIETY QUESTIONNAIRES
1. FEELING NERVOUS, ANXIOUS, OR ON EDGE: 2
IF YOU CHECKED OFF ANY PROBLEMS ON THIS QUESTIONNAIRE, HOW DIFFICULT HAVE THESE PROBLEMS MADE IT FOR YOU TO DO YOUR WORK, TAKE CARE OF THINGS AT HOME, OR GET ALONG WITH OTHER PEOPLE: SOMEWHAT DIFFICULT
7. FEELING AFRAID AS IF SOMETHING AWFUL MIGHT HAPPEN: 0
GAD7 TOTAL SCORE: 5
3. WORRYING TOO MUCH ABOUT DIFFERENT THINGS: 0
5. BEING SO RESTLESS THAT IT IS HARD TO SIT STILL: 1
2. NOT BEING ABLE TO STOP OR CONTROL WORRYING: 0
6. BECOMING EASILY ANNOYED OR IRRITABLE: 1
4. TROUBLE RELAXING: 1

## 2023-03-13 ENCOUNTER — OFFICE VISIT (OUTPATIENT)
Dept: PRIMARY CARE CLINIC | Age: 23
End: 2023-03-13
Payer: MEDICAID

## 2023-03-13 VITALS
HEART RATE: 90 BPM | TEMPERATURE: 97 F | WEIGHT: 124.8 LBS | SYSTOLIC BLOOD PRESSURE: 105 MMHG | HEIGHT: 62 IN | BODY MASS INDEX: 22.97 KG/M2 | DIASTOLIC BLOOD PRESSURE: 73 MMHG

## 2023-03-13 DIAGNOSIS — G43.709 CHRONIC MIGRAINE WITHOUT AURA WITHOUT STATUS MIGRAINOSUS, NOT INTRACTABLE: Primary | ICD-10-CM

## 2023-03-13 DIAGNOSIS — R10.2 PELVIC PAIN: ICD-10-CM

## 2023-03-13 PROBLEM — F17.200 CURRENT SMOKER: Status: RESOLVED | Noted: 2022-05-25 | Resolved: 2023-03-13

## 2023-03-13 PROCEDURE — 99214 OFFICE O/P EST MOD 30 MIN: CPT | Performed by: STUDENT IN AN ORGANIZED HEALTH CARE EDUCATION/TRAINING PROGRAM

## 2023-03-13 RX ORDER — SUMATRIPTAN 50 MG/1
50 TABLET, FILM COATED ORAL
Qty: 10 TABLET | Refills: 0 | Status: SHIPPED | OUTPATIENT
Start: 2023-03-13 | End: 2023-03-13

## 2023-03-13 SDOH — ECONOMIC STABILITY: FOOD INSECURITY: WITHIN THE PAST 12 MONTHS, THE FOOD YOU BOUGHT JUST DIDN'T LAST AND YOU DIDN'T HAVE MONEY TO GET MORE.: NEVER TRUE

## 2023-03-13 SDOH — ECONOMIC STABILITY: HOUSING INSECURITY
IN THE LAST 12 MONTHS, WAS THERE A TIME WHEN YOU DID NOT HAVE A STEADY PLACE TO SLEEP OR SLEPT IN A SHELTER (INCLUDING NOW)?: NO

## 2023-03-13 SDOH — ECONOMIC STABILITY: FOOD INSECURITY: WITHIN THE PAST 12 MONTHS, YOU WORRIED THAT YOUR FOOD WOULD RUN OUT BEFORE YOU GOT MONEY TO BUY MORE.: NEVER TRUE

## 2023-03-13 SDOH — ECONOMIC STABILITY: INCOME INSECURITY: HOW HARD IS IT FOR YOU TO PAY FOR THE VERY BASICS LIKE FOOD, HOUSING, MEDICAL CARE, AND HEATING?: NOT HARD AT ALL

## 2023-03-13 ASSESSMENT — ENCOUNTER SYMPTOMS
NAUSEA: 0
DIARRHEA: 0
CONSTIPATION: 0
ABDOMINAL PAIN: 0
VOMITING: 0

## 2023-03-13 NOTE — PROGRESS NOTES
[facility]  3/13/2023    Amish Schafer (:  2000) is a 25 y.o. female, here for evaluation of the following medical concerns:    Chief Complaint   Patient presents with    Follow-up     For migraines/nurtec denied       Other     More pelvic pain a lot worse         ASSESSMENT/ PLAN  1. Chronic migraine without aura without status migrainosus, not intractable  Uncontrolled, this is a chronic problem. Has failed ibuprofen, Tylenol, Excedrin. Initiate sumatriptan; may ultimately benefit from Nurtec.  - SUMAtriptan (IMITREX) 50 MG tablet; Take 1 tablet by mouth once as needed for Migraine  Dispense: 10 tablet; Refill: 0    2. Pelvic pain  Uncontrolled, this is a chronic problem. Of note, patient has a significant psychiatric history. Ultrasound, CT abdomen pelvis unremarkable, previous STI/pregnancy testing negative. She declines trial of OCP. May need an ex lap to complete the work-up, would also benefit from psychotherapy. - [address], [place], , Gynecology, [place]un[place]     Return in about 4 weeks (around 4/10/2023) for migraines. HPI  Patient presents today for follow-up on pelvic pain, migraines. She states that she has migraines every day. Sometimes they are bilateral frontal, and sometimes they are unilateral/sharp/stabbing. She has tried ibuprofen, Tylenol and Excedrin without improvement. She does not take NSAIDs daily. + Photophobia, phonophobia. No vomiting, change in vision or tinnitus. She notes persistent pelvic pain. She was previously seen by OB/GYN, but is requesting referral to a female OB/GYN at this time. Pain is worse during her menstrual cycles. Birth control is tubal ligation. She denies vaginal discharge, odor, suprapubic pain, dysuria, fever or chills. Work-up thus far has been unremarkable. ROS  Review of Systems   Constitutional:  Negative for fatigue and unexpected weight change.    Gastrointestinal:  Negative for abdominal pain, constipation, diarrhea, nausea and vomiting. Genitourinary:  Positive for pelvic pain. Negative for difficulty urinating, dyspareunia, dysuria, genital sores, menstrual problem and vaginal discharge. Neurological:  Positive for headaches. HISTORIES  Current Outpatient Medications on File Prior to Visit   Medication Sig Dispense Refill    DULoxetine (CYMBALTA) 60 MG extended release capsule Take 1 capsule by mouth daily 30 capsule 2    prazosin (MINIPRESS) 2 MG capsule Take 1 capsule by mouth nightly 30 capsule 2    traZODone (DESYREL) 50 MG tablet Take 1 tablet by mouth nightly as needed for Sleep 30 tablet 5    valACYclovir (VALTREX) 500 MG tablet Take 1 tablet by mouth daily 30 tablet 11    Rimegepant Sulfate 75 MG TBDP Take 75 mg by mouth every other day (Patient not taking: Reported on 3/13/2023) 16 tablet 11    naproxen (NAPROSYN) 500 MG tablet Take 1 tablet by mouth 2 times daily (with meals) (Patient not taking: Reported on 12/27/2022) 30 tablet 0     No current facility-administered medications on file prior to visit. Past Medical History:   Diagnosis Date    Bipolar 1 disorder [facility])      Patient Active Problem List   Diagnosis    Generalized anxiety disorder with panic attacks    Primary insomnia    Chronic post-traumatic stress disorder    Moderate episode of recurrent major depressive disorder (HCC)    Borderline personality disorder ([facility] [address]    Social anxiety disorder    Chronic migraine without aura without status migrainosus, not intractable    Pelvic pain       PE  Vitals:    03/13/23 1239   BP: 105/73   Pulse: 90   Temp: 97 °F (36.1 °C)   TempSrc: Temporal   Weight: 124 lb 12.8 oz (56.6 kg)   Height: 5' 2\" (1.575 m)     Estimated body mass index is 22.83 kg/m² as calculated from the following:    Height as of this encounter: 5' 2\" (1.575 m). Weight as of this encounter: 124 lb 12.8 oz (56.6 kg). Physical Exam  Vitals and nursing note reviewed.    Constitutional:       Appearance: Normal appearance. She is not ill-appearing. HENT:      Head: Normocephalic and atraumatic. Mouth/Throat:      Mouth: Mucous membranes are moist.      Pharynx: Oropharynx is clear. Cardiovascular:      Rate and Rhythm: Normal rate and regular rhythm. Pulmonary:      Effort: Pulmonary effort is normal.      Breath sounds: Normal breath sounds. Abdominal:      General: Abdomen is flat. Bowel sounds are normal. There is no distension. Palpations: Abdomen is soft. Tenderness: There is no abdominal tenderness. There is no right CVA tenderness, left CVA tenderness, guarding or rebound. Skin:     General: Skin is warm. Coloration: Skin is not jaundiced. Neurological:      Mental Status: She is alert. Psychiatric:         Mood and Affect: Mood normal.         Behavior: Behavior normal.       Zoila Aguilar DO    This dictation was generated by voice recognition computer software. Although all attempts are made to edit the dictation for accuracy, there may be errors in the transcription that are not intended.

## 2023-03-13 NOTE — PATIENT INSTRUCTIONS
[facility] - Francois Rich, DO  [address]  [address] [place] [address]  RAEBD:[phone]

## 2023-04-28 ENCOUNTER — TELEMEDICINE (OUTPATIENT)
Dept: PSYCHIATRY | Age: 23
End: 2023-04-28

## 2023-04-28 DIAGNOSIS — F43.12 CHRONIC POST-TRAUMATIC STRESS DISORDER: ICD-10-CM

## 2023-04-28 DIAGNOSIS — F41.1 GENERALIZED ANXIETY DISORDER WITH PANIC ATTACKS: ICD-10-CM

## 2023-04-28 DIAGNOSIS — F33.1 MODERATE EPISODE OF RECURRENT MAJOR DEPRESSIVE DISORDER (HCC): ICD-10-CM

## 2023-04-28 DIAGNOSIS — Z79.899 LITHIUM USE: Primary | ICD-10-CM

## 2023-04-28 DIAGNOSIS — F41.0 GENERALIZED ANXIETY DISORDER WITH PANIC ATTACKS: ICD-10-CM

## 2023-04-28 RX ORDER — LITHIUM CARBONATE 300 MG
300 TABLET ORAL DAILY
Qty: 30 TABLET | Refills: 2 | Status: SHIPPED | OUTPATIENT
Start: 2023-04-28

## 2023-04-28 RX ORDER — PRAZOSIN HYDROCHLORIDE 2 MG/1
2 CAPSULE ORAL NIGHTLY
Qty: 30 CAPSULE | Refills: 2 | Status: SHIPPED | OUTPATIENT
Start: 2023-04-28

## 2023-04-28 RX ORDER — DULOXETIN HYDROCHLORIDE 60 MG/1
60 CAPSULE, DELAYED RELEASE ORAL DAILY
Qty: 30 CAPSULE | Refills: 2 | Status: SHIPPED | OUTPATIENT
Start: 2023-04-28

## 2023-04-28 ASSESSMENT — ANXIETY QUESTIONNAIRES
IF YOU CHECKED OFF ANY PROBLEMS ON THIS QUESTIONNAIRE, HOW DIFFICULT HAVE THESE PROBLEMS MADE IT FOR YOU TO DO YOUR WORK, TAKE CARE OF THINGS AT HOME, OR GET ALONG WITH OTHER PEOPLE: VERY DIFFICULT
7. FEELING AFRAID AS IF SOMETHING AWFUL MIGHT HAPPEN: 1
3. WORRYING TOO MUCH ABOUT DIFFERENT THINGS: 2
5. BEING SO RESTLESS THAT IT IS HARD TO SIT STILL: 3
6. BECOMING EASILY ANNOYED OR IRRITABLE: 3
GAD7 TOTAL SCORE: 16
4. TROUBLE RELAXING: 1
2. NOT BEING ABLE TO STOP OR CONTROL WORRYING: 3
1. FEELING NERVOUS, ANXIOUS, OR ON EDGE: 3

## 2023-04-28 ASSESSMENT — PATIENT HEALTH QUESTIONNAIRE - PHQ9
SUM OF ALL RESPONSES TO PHQ QUESTIONS 1-9: 17
6. FEELING BAD ABOUT YOURSELF - OR THAT YOU ARE A FAILURE OR HAVE LET YOURSELF OR YOUR FAMILY DOWN: 1
5. POOR APPETITE OR OVEREATING: 0
4. FEELING TIRED OR HAVING LITTLE ENERGY: 3
7. TROUBLE CONCENTRATING ON THINGS, SUCH AS READING THE NEWSPAPER OR WATCHING TELEVISION: 3
1. LITTLE INTEREST OR PLEASURE IN DOING THINGS: 3
2. FEELING DOWN, DEPRESSED OR HOPELESS: 3
9. THOUGHTS THAT YOU WOULD BE BETTER OFF DEAD, OR OF HURTING YOURSELF: 0
SUM OF ALL RESPONSES TO PHQ9 QUESTIONS 1 & 2: 6
SUM OF ALL RESPONSES TO PHQ QUESTIONS 1-9: 17
10. IF YOU CHECKED OFF ANY PROBLEMS, HOW DIFFICULT HAVE THESE PROBLEMS MADE IT FOR YOU TO DO YOUR WORK, TAKE CARE OF THINGS AT HOME, OR GET ALONG WITH OTHER PEOPLE: 2
SUM OF ALL RESPONSES TO PHQ QUESTIONS 1-9: 17
3. TROUBLE FALLING OR STAYING ASLEEP: 3
8. MOVING OR SPEAKING SO SLOWLY THAT OTHER PEOPLE COULD HAVE NOTICED. OR THE OPPOSITE, BEING SO FIGETY OR RESTLESS THAT YOU HAVE BEEN MOVING AROUND A LOT MORE THAN USUAL: 1
SUM OF ALL RESPONSES TO PHQ QUESTIONS 1-9: 17

## 2023-05-05 ASSESSMENT — ENCOUNTER SYMPTOMS
EYES NEGATIVE: 1
GASTROINTESTINAL NEGATIVE: 1
RESPIRATORY NEGATIVE: 1
ALLERGIC/IMMUNOLOGIC NEGATIVE: 1

## 2023-05-30 ENCOUNTER — TELEMEDICINE (OUTPATIENT)
Dept: PSYCHIATRY | Age: 23
End: 2023-05-30
Payer: MEDICAID

## 2023-05-30 DIAGNOSIS — F43.12 CHRONIC POST-TRAUMATIC STRESS DISORDER: ICD-10-CM

## 2023-05-30 DIAGNOSIS — F41.1 GENERALIZED ANXIETY DISORDER WITH PANIC ATTACKS: ICD-10-CM

## 2023-05-30 DIAGNOSIS — F33.1 MODERATE EPISODE OF RECURRENT MAJOR DEPRESSIVE DISORDER (HCC): ICD-10-CM

## 2023-05-30 DIAGNOSIS — F41.0 GENERALIZED ANXIETY DISORDER WITH PANIC ATTACKS: ICD-10-CM

## 2023-05-30 DIAGNOSIS — F51.01 PRIMARY INSOMNIA: ICD-10-CM

## 2023-05-30 PROCEDURE — 99214 OFFICE O/P EST MOD 30 MIN: CPT | Performed by: STUDENT IN AN ORGANIZED HEALTH CARE EDUCATION/TRAINING PROGRAM

## 2023-05-30 RX ORDER — DULOXETIN HYDROCHLORIDE 60 MG/1
60 CAPSULE, DELAYED RELEASE ORAL DAILY
Qty: 30 CAPSULE | Refills: 2 | Status: SHIPPED | OUTPATIENT
Start: 2023-05-30

## 2023-05-30 RX ORDER — PRAZOSIN HYDROCHLORIDE 2 MG/1
2 CAPSULE ORAL NIGHTLY
Qty: 30 CAPSULE | Refills: 2 | Status: SHIPPED | OUTPATIENT
Start: 2023-05-30

## 2023-05-30 RX ORDER — TRAZODONE HYDROCHLORIDE 100 MG/1
50 TABLET ORAL NIGHTLY PRN
Qty: 30 TABLET | Refills: 2 | Status: SHIPPED | OUTPATIENT
Start: 2023-05-30

## 2023-05-30 RX ORDER — PRAZOSIN HYDROCHLORIDE 1 MG/1
1 CAPSULE ORAL NIGHTLY
Qty: 30 CAPSULE | Refills: 3 | Status: SHIPPED | OUTPATIENT
Start: 2023-05-30

## 2023-05-30 ASSESSMENT — PATIENT HEALTH QUESTIONNAIRE - PHQ9
1. LITTLE INTEREST OR PLEASURE IN DOING THINGS: 2
5. POOR APPETITE OR OVEREATING: 1
3. TROUBLE FALLING OR STAYING ASLEEP: 1
6. FEELING BAD ABOUT YOURSELF - OR THAT YOU ARE A FAILURE OR HAVE LET YOURSELF OR YOUR FAMILY DOWN: 0
SUM OF ALL RESPONSES TO PHQ QUESTIONS 1-9: 8
7. TROUBLE CONCENTRATING ON THINGS, SUCH AS READING THE NEWSPAPER OR WATCHING TELEVISION: 2
SUM OF ALL RESPONSES TO PHQ QUESTIONS 1-9: 8
9. THOUGHTS THAT YOU WOULD BE BETTER OFF DEAD, OR OF HURTING YOURSELF: 0
SUM OF ALL RESPONSES TO PHQ9 QUESTIONS 1 & 2: 3
2. FEELING DOWN, DEPRESSED OR HOPELESS: 1
8. MOVING OR SPEAKING SO SLOWLY THAT OTHER PEOPLE COULD HAVE NOTICED. OR THE OPPOSITE, BEING SO FIGETY OR RESTLESS THAT YOU HAVE BEEN MOVING AROUND A LOT MORE THAN USUAL: 0
4. FEELING TIRED OR HAVING LITTLE ENERGY: 1

## 2023-05-30 ASSESSMENT — ANXIETY QUESTIONNAIRES
2. NOT BEING ABLE TO STOP OR CONTROL WORRYING: 1
GAD7 TOTAL SCORE: 10
4. TROUBLE RELAXING: 2
1. FEELING NERVOUS, ANXIOUS, OR ON EDGE: 2
7. FEELING AFRAID AS IF SOMETHING AWFUL MIGHT HAPPEN: 0
5. BEING SO RESTLESS THAT IT IS HARD TO SIT STILL: 1
3. WORRYING TOO MUCH ABOUT DIFFERENT THINGS: 1
6. BECOMING EASILY ANNOYED OR IRRITABLE: 3

## 2023-06-04 SDOH — HEALTH STABILITY: PHYSICAL HEALTH: ON AVERAGE, HOW MANY MINUTES DO YOU ENGAGE IN EXERCISE AT THIS LEVEL?: 30 MIN

## 2023-06-04 SDOH — HEALTH STABILITY: PHYSICAL HEALTH: ON AVERAGE, HOW MANY DAYS PER WEEK DO YOU ENGAGE IN MODERATE TO STRENUOUS EXERCISE (LIKE A BRISK WALK)?: 3 DAYS

## 2023-06-06 ENCOUNTER — OFFICE VISIT (OUTPATIENT)
Dept: PRIMARY CARE CLINIC | Age: 23
End: 2023-06-06
Payer: MEDICAID

## 2023-06-06 VITALS
OXYGEN SATURATION: 98 % | BODY MASS INDEX: 21.19 KG/M2 | HEART RATE: 95 BPM | SYSTOLIC BLOOD PRESSURE: 110 MMHG | HEIGHT: 63 IN | WEIGHT: 119.6 LBS | DIASTOLIC BLOOD PRESSURE: 60 MMHG

## 2023-06-06 DIAGNOSIS — A60.00 GENITAL HERPES SIMPLEX, UNSPECIFIED SITE: ICD-10-CM

## 2023-06-06 DIAGNOSIS — R10.2 PELVIC PAIN: ICD-10-CM

## 2023-06-06 DIAGNOSIS — F43.12 CHRONIC POST-TRAUMATIC STRESS DISORDER: ICD-10-CM

## 2023-06-06 DIAGNOSIS — F33.1 MODERATE EPISODE OF RECURRENT MAJOR DEPRESSIVE DISORDER (HCC): ICD-10-CM

## 2023-06-06 DIAGNOSIS — R42 DIZZINESS: ICD-10-CM

## 2023-06-06 DIAGNOSIS — G43.109 CHRONIC MIGRAINE WITH AURA: Primary | ICD-10-CM

## 2023-06-06 DIAGNOSIS — Z76.89 ESTABLISHING CARE WITH NEW DOCTOR, ENCOUNTER FOR: ICD-10-CM

## 2023-06-06 DIAGNOSIS — F60.3 BORDERLINE PERSONALITY DISORDER (HCC): ICD-10-CM

## 2023-06-06 PROBLEM — G43.E09 CHRONIC MIGRAINE WITH AURA: Status: ACTIVE | Noted: 2023-06-06

## 2023-06-06 PROCEDURE — 99214 OFFICE O/P EST MOD 30 MIN: CPT | Performed by: NURSE PRACTITIONER

## 2023-06-06 RX ORDER — VALACYCLOVIR HYDROCHLORIDE 500 MG/1
500 TABLET, FILM COATED ORAL DAILY
Qty: 30 TABLET | Refills: 5 | Status: SHIPPED | OUTPATIENT
Start: 2023-06-06

## 2023-06-06 RX ORDER — ATOGEPANT 30 MG/1
TABLET ORAL
Qty: 30 TABLET | Refills: 1 | Status: SHIPPED | OUTPATIENT
Start: 2023-06-06

## 2023-06-06 ASSESSMENT — ENCOUNTER SYMPTOMS
ABDOMINAL PAIN: 0
NAUSEA: 0
CONSTIPATION: 1
CHEST TIGHTNESS: 0
VOMITING: 0
SHORTNESS OF BREATH: 0
SORE THROAT: 0
BLOOD IN STOOL: 0
COUGH: 0
WHEEZING: 0
DIARRHEA: 0
BACK PAIN: 0

## 2023-06-07 PROBLEM — A60.00 GENITAL HERPES SIMPLEX: Status: ACTIVE | Noted: 2023-06-07

## 2023-06-07 PROBLEM — G43.709 CHRONIC MIGRAINE WITHOUT AURA WITHOUT STATUS MIGRAINOSUS, NOT INTRACTABLE: Status: RESOLVED | Noted: 2022-10-13 | Resolved: 2023-06-07

## 2023-06-23 ASSESSMENT — ENCOUNTER SYMPTOMS
ALLERGIC/IMMUNOLOGIC NEGATIVE: 1
EYES NEGATIVE: 1
RESPIRATORY NEGATIVE: 1
GASTROINTESTINAL NEGATIVE: 1

## 2023-06-27 ENCOUNTER — OFFICE VISIT (OUTPATIENT)
Dept: PRIMARY CARE CLINIC | Age: 23
End: 2023-06-27
Payer: COMMERCIAL

## 2023-06-27 VITALS
BODY MASS INDEX: 22.25 KG/M2 | TEMPERATURE: 98.1 F | WEIGHT: 123.6 LBS | DIASTOLIC BLOOD PRESSURE: 62 MMHG | HEART RATE: 100 BPM | SYSTOLIC BLOOD PRESSURE: 100 MMHG | OXYGEN SATURATION: 99 %

## 2023-06-27 DIAGNOSIS — R30.0 DYSURIA: ICD-10-CM

## 2023-06-27 DIAGNOSIS — N89.8 VAGINAL IRRITATION: Primary | ICD-10-CM

## 2023-06-27 DIAGNOSIS — A60.00 GENITAL HERPES SIMPLEX, UNSPECIFIED SITE: ICD-10-CM

## 2023-06-27 DIAGNOSIS — G43.109 CHRONIC MIGRAINE WITH AURA: ICD-10-CM

## 2023-06-27 LAB
BILIRUBIN, POC: NORMAL
BLOOD URINE, POC: NORMAL
CLARITY, POC: CLEAR
COLOR, POC: YELLOW
GLUCOSE URINE, POC: NORMAL
KETONES, POC: NORMAL
LEUKOCYTE EST, POC: NORMAL
NITRITE, POC: NORMAL
PH, POC: 7
PROTEIN, POC: NORMAL
SPECIFIC GRAVITY, POC: 1.01
UROBILINOGEN, POC: 0.2

## 2023-06-27 PROCEDURE — 99214 OFFICE O/P EST MOD 30 MIN: CPT | Performed by: NURSE PRACTITIONER

## 2023-06-27 PROCEDURE — 81002 URINALYSIS NONAUTO W/O SCOPE: CPT | Performed by: NURSE PRACTITIONER

## 2023-06-27 RX ORDER — NYSTATIN 100000 U/G
CREAM TOPICAL
Qty: 30 G | Refills: 0 | Status: SHIPPED | OUTPATIENT
Start: 2023-06-27

## 2023-06-27 ASSESSMENT — ENCOUNTER SYMPTOMS
SORE THROAT: 0
BACK PAIN: 0
DIARRHEA: 0
NAUSEA: 0
VOMITING: 0
CONSTIPATION: 1
BLOOD IN STOOL: 0
COUGH: 0
WHEEZING: 0
ABDOMINAL PAIN: 0
SHORTNESS OF BREATH: 0
CHEST TIGHTNESS: 0

## 2023-06-28 DIAGNOSIS — G43.109 CHRONIC MIGRAINE WITH AURA: ICD-10-CM

## 2023-06-28 RX ORDER — ATOGEPANT 30 MG/1
TABLET ORAL
Qty: 30 TABLET | Refills: 1 | Status: SHIPPED | OUTPATIENT
Start: 2023-06-28

## 2023-06-30 ENCOUNTER — TELEPHONE (OUTPATIENT)
Dept: ADMINISTRATIVE | Age: 23
End: 2023-06-30

## 2023-06-30 NOTE — TELEPHONE ENCOUNTER
Submitted PA for  [employer] tablets   [facility]ia [facility]  ([place]) - [id number]  STATUS: PENDING. Follow up done daily; if no response in three days we will refax for status check. If another three days goes by with no response we will call the insurance for status.

## 2023-07-05 ENCOUNTER — OFFICE VISIT (OUTPATIENT)
Dept: OBGYN CLINIC | Age: 23
End: 2023-07-05
Payer: COMMERCIAL

## 2023-07-05 VITALS
HEIGHT: 63 IN | SYSTOLIC BLOOD PRESSURE: 110 MMHG | HEART RATE: 84 BPM | BODY MASS INDEX: 21.62 KG/M2 | WEIGHT: 122 LBS | DIASTOLIC BLOOD PRESSURE: 62 MMHG | TEMPERATURE: 98.4 F

## 2023-07-05 DIAGNOSIS — N94.6 DYSMENORRHEA: Primary | ICD-10-CM

## 2023-07-05 DIAGNOSIS — Z98.891 HISTORY OF CESAREAN DELIVERY: ICD-10-CM

## 2023-07-05 PROCEDURE — 99203 OFFICE O/P NEW LOW 30 MIN: CPT | Performed by: OBSTETRICS & GYNECOLOGY

## 2023-07-05 RX ORDER — ACETAMINOPHEN AND CODEINE PHOSPHATE 120; 12 MG/5ML; MG/5ML
1 SOLUTION ORAL DAILY
Qty: 30 TABLET | Refills: 5 | Status: SHIPPED | OUTPATIENT
Start: 2023-07-05

## 2023-07-05 ASSESSMENT — ENCOUNTER SYMPTOMS
COUGH: 0
SORE THROAT: 0
VOMITING: 0
ABDOMINAL PAIN: 0
SHORTNESS OF BREATH: 0
CONSTIPATION: 0
DIARRHEA: 0
NAUSEA: 0

## 2023-07-05 NOTE — PROGRESS NOTES
vaginal discharge. Neurological:  Positive for headaches (migraine with aura). Negative for dizziness. All other systems reviewed and are negative. Breast: Denies skin changes, nipple discharge, lesions, dimpling, tenderness or palpable masses     Primary Care Physician: VLAD Jaffe - CNP    Obstetric History  OB History    Para Term  AB Living   2 2 2     2   SAB IAB Ectopic Molar Multiple Live Births             2      # Outcome Date GA Lbr Shmuel/2nd Weight Sex Delivery Anes PTL Lv   2 Term      CS-Classical   TERESITA   1 Term 2019     CS-Classical   TERESITA       GynecologicHistory  Menstrual History:  LMP: Patient's last menstrual period was 2023. Age of Menarche: 13  Menstrual Period: irregular  Interval Between Menses: 3-8 weeks   Duration of Menses: 3-4  Menstrual Flow: Moderate  Bleeding between menses: Denies  Painful menses: Reports     Sexual History:  Contraception: see above  Currently is sexually active  5 Lifetime partners  Reports history of STIs - Genital HSV  Reports sexual problems - occasional pain    Pap History:  History of abnormal pap smears: see above  Last pap: see above      Medical History:  Past Medical History:   Diagnosis Date    Bipolar 1 disorder ([facility])     Depression     Herpes simplex virus (HSV) infection     Migraine     Postpartum depression 2019    I had postpartum depression after i had both of my kids    Trauma        Medications:  Current Outpatient Medications   Medication Sig Dispense Refill    norethindrone (ORTHO MICRONOR) 0.35 MG tablet Take 1 tablet by mouth daily 30 tablet 5    Atogepant (QULIPTA) 30 MG TABS Take 1 tab PO daily for chronic migraines 30 tablet 1    nystatin (MYCOSTATIN) 462520 UNIT/GM cream Apply topically 2 times daily.  30 g 0    valACYclovir (VALTREX) 500 MG tablet Take 1 tablet by mouth daily 30 tablet 5    prazosin (MINIPRESS) 2 MG capsule Take 1 capsule by mouth nightly Take with the 1mg

## 2023-07-06 ENCOUNTER — TELEPHONE (OUTPATIENT)
Dept: PRIMARY CARE CLINIC | Age: 23
End: 2023-07-06

## 2023-07-06 NOTE — TELEPHONE ENCOUNTER
Need PA   Atogepant ([facility]) 30 MG TABS [[id number]]     Order Details  Dose, Route, Frequency: As Directed   Dispense Quantity: 30 tablet Refills: 1          Sig: Take 1 tab PO daily for chronic migraines         Start Date: 06/28/23 End Date: --   Written Date: 06/28/23 Expiration Date: 06/27/24       Diagnosis Association: Chronic migraine with aura (G43.109)   Original Order:  Kirsten Adams ([facility]) 30 MG TABS [[id number]]   Providers    Authorizing Provider: VLAD Walton CNP NPI: [id number]   Ordering User:  VLAD Walton, [place] - [address] [place] [address] [phone] - F [phone]   [facility] Maury Gee) [address]   Phone:  [phone]  Fax:  [phone] Alert and oriented, no focal deficits, no motor or sensory deficits.

## 2023-07-07 NOTE — TELEPHONE ENCOUNTER
Please call patient and schedule appt with me next week to discuss options or I can refer her to Neurology.

## 2023-08-01 ENCOUNTER — OFFICE VISIT (OUTPATIENT)
Dept: OBGYN CLINIC | Age: 23
End: 2023-08-01
Payer: COMMERCIAL

## 2023-08-01 VITALS
HEIGHT: 63 IN | WEIGHT: 121.2 LBS | SYSTOLIC BLOOD PRESSURE: 98 MMHG | DIASTOLIC BLOOD PRESSURE: 64 MMHG | HEART RATE: 78 BPM | BODY MASS INDEX: 21.48 KG/M2 | TEMPERATURE: 98.2 F

## 2023-08-01 DIAGNOSIS — Z98.891 HISTORY OF CESAREAN DELIVERY: ICD-10-CM

## 2023-08-01 DIAGNOSIS — N94.6 DYSMENORRHEA: Primary | ICD-10-CM

## 2023-08-01 PROCEDURE — 99213 OFFICE O/P EST LOW 20 MIN: CPT | Performed by: OBSTETRICS & GYNECOLOGY

## 2023-08-03 ENCOUNTER — TELEPHONE (OUTPATIENT)
Dept: ADMINISTRATIVE | Age: 23
End: 2023-08-03

## 2023-08-03 NOTE — TELEPHONE ENCOUNTER
Submitted PA for [employer]  [facility]ia [facility] (Key: [id number] STATUS: PENDING. Follow up done daily; if no response in three days we will refax for status check. If another three days goes by with no response we will call the insurance for status.

## 2023-08-04 NOTE — TELEPHONE ENCOUNTER
The medication was DENIED; DENIAL letter uploaded to MEDIA. If you want an APPEAL; please note in this encounter what new information you would like to APPEAL with. Once complete route back to PA POOL. If this requires a response please respond to the pool ( P [phone]). Thank you please advise patient.

## 2023-08-07 RX ORDER — ACETAMINOPHEN AND CODEINE PHOSPHATE 120; 12 MG/5ML; MG/5ML
1 SOLUTION ORAL DAILY
Qty: 30 TABLET | Refills: 5 | Status: SHIPPED | OUTPATIENT
Start: 2023-08-07

## 2023-08-07 NOTE — TELEPHONE ENCOUNTER
Called patient [phone]. Talked with patient. Patient exhibited clear understanding of the message below. She will think about what she wants to do andl lets know.

## 2023-08-23 ENCOUNTER — TELEPHONE (OUTPATIENT)
Dept: OBGYN CLINIC | Age: 23
End: 2023-08-23

## 2023-08-23 NOTE — TELEPHONE ENCOUNTER
Pt last in office on 8/1/23 for Dysmenorrhea. She says she has been taking the OCP but it is not helping. She reports that \" everything is worse\" Reports she has shooting abdominal pains and cramping. She is currently on her cycle and the bleeding is heavier. She is having to change a pad about every 2 hours. Pt was given bleeding precautions. Pt is aware if bleeding heavy to the point of soaking a pad or tampon every hour or having large clots that she will need to report to the Emergency Department. Pt scheduled for follow up visit on 9/15/23.  Please advise

## 2023-08-23 NOTE — TELEPHONE ENCOUNTER
Thank you for scheduling a follow-up. We will see her at that time with the ED precautions as above prior to that. Sometimes it does take 2-3 months for the birth control to completely help symptoms. If she is able to tolerate Motrin please take 600 mg scheduled every 8 hours for 3 days and this can help both with the pain and to decrease bleeding while she is adjusting to the birth control. Thank you.

## 2023-09-15 ENCOUNTER — OFFICE VISIT (OUTPATIENT)
Dept: OBGYN CLINIC | Age: 23
End: 2023-09-15

## 2023-09-15 VITALS
HEART RATE: 99 BPM | HEIGHT: 63 IN | TEMPERATURE: 97 F | OXYGEN SATURATION: 99 % | BODY MASS INDEX: 21.09 KG/M2 | WEIGHT: 119 LBS | SYSTOLIC BLOOD PRESSURE: 98 MMHG | DIASTOLIC BLOOD PRESSURE: 58 MMHG

## 2023-09-15 DIAGNOSIS — N93.9 ABNORMAL UTERINE BLEEDING (AUB): Primary | ICD-10-CM

## 2023-09-15 DIAGNOSIS — R10.2 PELVIC PAIN: ICD-10-CM

## 2023-09-15 DIAGNOSIS — R42 DIZZINESS: ICD-10-CM

## 2023-09-15 RX ORDER — DOXYCYCLINE HYCLATE 100 MG
100 TABLET ORAL 2 TIMES DAILY
Qty: 20 TABLET | Refills: 0 | Status: SHIPPED | OUTPATIENT
Start: 2023-09-15 | End: 2023-09-25

## 2023-09-16 LAB
BASOPHILS # BLD: 0.1 K/UL (ref 0–0.2)
BASOPHILS NFR BLD: 0.8 %
DEPRECATED RDW RBC AUTO: 13.2 % (ref 12.4–15.4)
EOSINOPHIL # BLD: 0.1 K/UL (ref 0–0.6)
EOSINOPHIL NFR BLD: 0.8 %
HCT VFR BLD AUTO: 42.9 % (ref 36–48)
HGB BLD-MCNC: 14.5 G/DL (ref 12–16)
LYMPHOCYTES # BLD: 2.2 K/UL (ref 1–5.1)
LYMPHOCYTES NFR BLD: 31 %
MCH RBC QN AUTO: 30.5 PG (ref 26–34)
MCHC RBC AUTO-ENTMCNC: 33.8 G/DL (ref 31–36)
MCV RBC AUTO: 90.1 FL (ref 80–100)
MONOCYTES # BLD: 0.6 K/UL (ref 0–1.3)
MONOCYTES NFR BLD: 8.1 %
NEUTROPHILS # BLD: 4.2 K/UL (ref 1.7–7.7)
NEUTROPHILS NFR BLD: 59.3 %
PLATELET # BLD AUTO: 228 K/UL (ref 135–450)
PMV BLD AUTO: 10.2 FL (ref 5–10.5)
RBC # BLD AUTO: 4.76 M/UL (ref 4–5.2)
WBC # BLD AUTO: 7.1 K/UL (ref 4–11)

## 2023-09-19 ENCOUNTER — TELEPHONE (OUTPATIENT)
Dept: OBGYN CLINIC | Age: 23
End: 2023-09-19

## 2023-09-25 NOTE — TELEPHONE ENCOUNTER
Routine to Maximiilan Aguilar for surgery prior authorization. Will plan for diagnostic laparoscopy with possible fulguration of endometriosis. Thank you.

## 2023-09-26 NOTE — TELEPHONE ENCOUNTER
Called lvm , started pa with insurance all clinical info faxed over. Pa request usually takes 14-21 days for a response.      FYI

## 2023-10-10 NOTE — PROGRESS NOTES
Allergies   Allergen Reactions    Latex Rash    Prozac [Fluoxetine] Anxiety and Rash     Outpatient Medications Marked as Taking for the 10/11/23 encounter (Office Visit) with VLAD Caba CNP   Medication Sig Dispense Refill    amitriptyline (ELAVIL) 10 MG tablet Take 1 tablet by mouth nightly 30 tablet 0    norethindrone (ORTHO MICRONOR) 0.35 MG tablet Take 1 tablet by mouth daily 30 tablet 5    valACYclovir (VALTREX) 500 MG tablet Take 1 tablet by mouth daily 30 tablet 5    traZODone (DESYREL) 100 MG tablet Take 0.5 tablets by mouth nightly as needed for Sleep 30 tablet 2       Social History     Tobacco Use    Smoking status: Every Day     Types: E-Cigarettes    Smokeless tobacco: Never   Substance Use Topics    Alcohol use: Not Currently     Comment: occ     Family History   Problem Relation Age of Onset    Depression Mother     Anxiety Disorder Mother     Alcohol Abuse Mother     Mental Illness Mother     Schizophrenia Father     Bipolar Disorder Father     Suicide Attempts Father     Mental Illness Father     Suicide Attempts Paternal Uncle        Review of Systems  Review of Systems   Constitutional:  Negative for activity change, appetite change, chills, fatigue and unexpected weight change. HENT:  Negative for congestion, ear pain, hearing loss, nosebleeds, postnasal drip, sneezing and sore throat. Respiratory:  Negative for cough, chest tightness, shortness of breath and wheezing. Cardiovascular:  Negative for chest pain, palpitations and leg swelling. Gastrointestinal:  Positive for constipation (occ). Negative for abdominal pain, blood in stool, diarrhea, nausea and vomiting. Genitourinary:  Positive for pelvic pain (chronic). Negative for difficulty urinating and dysuria. Musculoskeletal:  Negative for arthralgias, back pain and neck pain. Skin:  Negative for rash. Neurological:  Positive for headaches (h/o migraines). Negative for dizziness, tremors and numbness.

## 2023-10-11 ENCOUNTER — OFFICE VISIT (OUTPATIENT)
Dept: PRIMARY CARE CLINIC | Age: 23
End: 2023-10-11
Payer: COMMERCIAL

## 2023-10-11 VITALS
HEART RATE: 86 BPM | OXYGEN SATURATION: 98 % | TEMPERATURE: 97.4 F | DIASTOLIC BLOOD PRESSURE: 82 MMHG | BODY MASS INDEX: 21.42 KG/M2 | SYSTOLIC BLOOD PRESSURE: 120 MMHG | WEIGHT: 119 LBS

## 2023-10-11 DIAGNOSIS — F17.200 CURRENT SMOKER: ICD-10-CM

## 2023-10-11 DIAGNOSIS — F60.3 BORDERLINE PERSONALITY DISORDER (HCC): ICD-10-CM

## 2023-10-11 DIAGNOSIS — G89.29 CHRONIC PELVIC PAIN IN FEMALE: ICD-10-CM

## 2023-10-11 DIAGNOSIS — Z01.818 PREOP EXAMINATION: Primary | ICD-10-CM

## 2023-10-11 DIAGNOSIS — F41.1 GAD (GENERALIZED ANXIETY DISORDER): ICD-10-CM

## 2023-10-11 DIAGNOSIS — A60.00 GENITAL HERPES SIMPLEX, UNSPECIFIED SITE: ICD-10-CM

## 2023-10-11 DIAGNOSIS — R10.2 CHRONIC PELVIC PAIN IN FEMALE: ICD-10-CM

## 2023-10-11 DIAGNOSIS — F43.12 CHRONIC POST-TRAUMATIC STRESS DISORDER: ICD-10-CM

## 2023-10-11 DIAGNOSIS — F31.9 BIPOLAR 1 DISORDER (HCC): ICD-10-CM

## 2023-10-11 DIAGNOSIS — G43.E19 INTRACTABLE CHRONIC MIGRAINE WITH AURA AND WITHOUT STATUS MIGRAINOSUS: ICD-10-CM

## 2023-10-11 DIAGNOSIS — F51.01 PRIMARY INSOMNIA: ICD-10-CM

## 2023-10-11 PROCEDURE — 99214 OFFICE O/P EST MOD 30 MIN: CPT | Performed by: NURSE PRACTITIONER

## 2023-10-11 RX ORDER — AMITRIPTYLINE HYDROCHLORIDE 10 MG/1
10 TABLET, FILM COATED ORAL NIGHTLY
Qty: 30 TABLET | Refills: 0 | Status: SHIPPED | OUTPATIENT
Start: 2023-10-11

## 2023-10-11 ASSESSMENT — ENCOUNTER SYMPTOMS
ABDOMINAL PAIN: 0
CHEST TIGHTNESS: 0
DIARRHEA: 0
SORE THROAT: 0
NAUSEA: 0
CONSTIPATION: 1
SHORTNESS OF BREATH: 0
BACK PAIN: 0
VOMITING: 0
BLOOD IN STOOL: 0
WHEEZING: 0
COUGH: 0

## 2023-10-11 NOTE — PATIENT INSTRUCTIONS
NEUROLOGY REFERRAL.  PLEASE CALL TO SCHEDULE    [facility], [address], [place]  [address] [facility], [address] [place] [address] Nw, [address]  Phone: [phone]

## 2023-10-18 ENCOUNTER — OFFICE VISIT (OUTPATIENT)
Dept: OBGYN CLINIC | Age: 23
End: 2023-10-18

## 2023-10-18 VITALS
HEART RATE: 78 BPM | TEMPERATURE: 97.2 F | BODY MASS INDEX: 21.6 KG/M2 | SYSTOLIC BLOOD PRESSURE: 116 MMHG | WEIGHT: 117.4 LBS | HEIGHT: 62 IN | DIASTOLIC BLOOD PRESSURE: 74 MMHG

## 2023-10-18 DIAGNOSIS — G89.29 CHRONIC PELVIC PAIN IN FEMALE: Primary | ICD-10-CM

## 2023-10-18 DIAGNOSIS — N93.9 ABNORMAL UTERINE BLEEDING (AUB): ICD-10-CM

## 2023-10-18 DIAGNOSIS — R10.2 CHRONIC PELVIC PAIN IN FEMALE: Primary | ICD-10-CM

## 2023-10-18 PROCEDURE — 99024 POSTOP FOLLOW-UP VISIT: CPT | Performed by: OBSTETRICS & GYNECOLOGY

## 2023-10-18 NOTE — PROGRESS NOTES
female who presents for to sign consents for upcoming diagnostic laparoscopy with possible fulguration of endometriosis. 1. Pelvic pain     - Hx of painful menses that started after her last  delivery     - Was noted to have significant scar tissue at time of       - Differential reviewed regarding new onset Dysmenorrhea     - US within normal limits, multi-follicular ovaries bilaterally     - Risks, benefits and alternatives were reviewed      - Poor candidate for estrogen therapy due to hx of migraine with aura     - Rx provided for micronor - initially did well with such, however has had increased pain recently      - Rx sent for Doxycycline to rule out infectious etiologies. - Desires surgical intervention at this time     - Will proceed with Diagnostic laparoscopy with possible fulguration of endometriosis. - All questions were answered to the patient's satisfaction     - Consents are signed and in chart     - Pre-op with PCP     - Avoid NSAIDs prior to procedure     - Pre-op instructions reviewed     - Post-op instructions and expectations reviewed     - Will plan for Motrin and Ultram post-operatively     - Return precautions reviewed     - Will follow-up with surgery as planned    2.  Abnormal uterine bleeding (AUB)     - Increased menstrual flow and pain with recent cycles - see below     - US with no acute findings     - ES 2.42mm - likelihood of bleeding includes progesterone effect on the endometrium     - Return precautions reviewed      Ivelisse Higgins DO

## 2023-10-18 NOTE — PROGRESS NOTES
Victory Rufino    Age 21 y.o.    female    2000    MRN [id number]    10/26/2023  Arrival Time_____________  OR Time____________75 Min     Procedure(s):  DIAGNOSTIC LAPAROSCOPY WITH POSSIBLE FULGURATION OF ENDOMETRIOSIS                      General    Surgeon(s):  Keely Notch, DO       Phone [phone] ([place])     [facility]  Cell         Work  _____________________________________________________________________  _____________________________________________________________________  _____________________________________________________________________  _____________________________________________________________________  _____________________________________________________________________    PCP _____________________________ Phone_________________     H&P__________________Bringing      Chart            Epic   DOS      Called________  EKG__________________Bringing      Chart            Epic   DOS      Called________  LAB__________________ Bringing      Chart            Epic   DOS      Called________  Cardiac Clearance_______Bringing      Chart            Epic      DOS      Called________    Cardiologist________________________ Phone___________________________    ? [employer] concerns / Waiver on Chart            [employer]________________  ? Pre-op Instructions Given [address]          _________________________________  ? Directions to Surgery Center                          _________________________________  ? Transportation Home_______________      __________________________________  ?  Crutches/Walker__________________        __________________________________    ________Pre-op Orders   _______Transcribed    _______Wt.  ________Pharmacy          _______SCD  ______VTE     ______TED Everlyn Ramy  _______  Surgery Consent    _______  Anesthesia Consent         COVID DATE______________LOCATION________________ RESULT__________    Sp Joy    Age 21 y.o.    female 2000    MRN [id number]    10/26/2023  Arrival Time_____________  OR Time____________75 Min     Procedure(s):  DIAGNOSTIC LAPAROSCOPY WITH POSSIBLE FULGURATION OF ENDOMETRIOSIS                      General    Surgeon(s):  Timura Eaton, DO       Phone [phone] (home)     [place]  Cell         Work  _____________________________________________________________________  _____________________________________________________________________  _____________________________________________________________________  _____________________________________________________________________  _____________________________________________________________________    PCP _____________________________ Phone_________________     H&P__________________Bringing      Chart            Epic   DOS      Called________  EKG__________________Bringing      Chart            Epic   DOS      Called________  LAB__________________ Bringing      Chart            Epic   DOS      Called________  Cardiac Clearance_______Bringing      Chart            Epic      DOS      Called________    Cardiologist________________________ Phone___________________________    ? [employer] concerns / Waiver on Chart            [employer]________________  ? Pre-op Instructions Given [address]          _________________________________  ? Directions to Surgery Center                          _________________________________  ? Transportation Home_______________      __________________________________  ?  Crutches/Walker__________________        __________________________________    ________Pre-op Orders   _______Transcribed    _______Wt.  ________Pharmacy          _______SCD  ______VTE     ______TED Zuri Kunal  _______  Surgery Consent    _______  Anesthesia Consent         COVID DATE______________LOCATION________________ RESULT__________

## 2023-10-18 NOTE — PROGRESS NOTES
Yfn Eng    Age 21 y.o.    female    2000    MRN [id number]    10/26/2023  Arrival Time_____________  OR Time____________75 Min     Procedure(s):  DIAGNOSTIC LAPAROSCOPY WITH POSSIBLE FULGURATION OF ENDOMETRIOSIS                      General    Surgeon(s):  Kristie Jesus, DO       Phone [phone] ([place])     [facility]  Cell         Work  _____________________________________________________________________  _____________________________________________________________________  _____________________________________________________________________  _____________________________________________________________________  _____________________________________________________________________    PCP _____________________________ Phone_________________     H&P__________________Bringing      Chart            Epic   DOS      Called________  EKG__________________Bringing      Chart            Epic   DOS      Called________  LAB__________________ Bringing      Chart            Epic   DOS      Called________  Cardiac Clearance_______Bringing      Chart            Epic      DOS      Called________    Cardiologist________________________ Phone___________________________    ? Mandaen concerns / Waiver on Chart            [employer]________________  ? Pre-op Instructions Given [address]          _________________________________  ? Directions to Surgery Center                          _________________________________  ? Transportation Home_______________      __________________________________  ?  Crutches/Walker__________________        __________________________________    ________Pre-op Orders   _______Transcribed    _______Wt.  ________Pharmacy          _______SCD  ______VTE     ______TED Levester Tavia  _______  Surgery Consent    _______  Anesthesia Consent         COVID DATE______________LOCATION________________ RESULT__________

## 2023-10-25 ENCOUNTER — ANESTHESIA EVENT (OUTPATIENT)
Dept: OPERATING ROOM | Age: 23
End: 2023-10-25
Payer: COMMERCIAL

## 2023-10-25 NOTE — PROGRESS NOTES
Date and time of surgery :    10/26/23 at 80          Arrival Time:  610     Bring Picture ID and insurance card. Please wear simple, loose fitting clothing to the hospital.   Jw Liu not bring valuables (money, credit cards, checkbooks, etc.)   Do not wear any makeup (including  eye makeup) and no nail polish or artificial nails on your fingers or toes. DO NOT wear any jewelry or piercings on day of surgery. All body piercing jewelry must be removed. If you have dentures, they will be removed before going to the OR; we will provide you a container. If you wear contact lenses or glasses, they will be removed; please bring a case for them. Shower the evening before or morning of surgery with antibacterial soap. Nothing to eat or drink after midnight the day before surgery. You may brush your teeth and gargle the morning of surgery. DO NOT SWALLOW WATER. Do not take any morning meds the day of your surgery. Aspirin, Ibuprofen, Advil, Naproxen, Vitamin E and other Anti-inflammatory products and supplements should be stopped for 5 -7days before surgery or as directed by your physician. Do not smoke or drink any alcoholic beverages 24 hours prior to surgery. This includes NA Beer. Refrain from the usage of any recreational drugs, including non-prescribed prescription drugs. You MUST plan for a responsible adult to stay on site while you are here and take you home after your surgery. You will not be allowed to leave alone or drive yourself home. It is strongly suggested someone stay with you the first 24 hrs. Your surgery will be cancelled if you do not have a ride home. To help prevent infection, change your sheets the night before surgery. If you  have a Living Will and Durable Power of  for Healthcare, please bring in a copy. Notify your Surgeon if you develop any illness between now and time of surgery.  Cough, cold, fever, sore throat, nausea, vomiting, etc.  Please notify your surgeon if

## 2023-10-26 ENCOUNTER — HOSPITAL ENCOUNTER (OUTPATIENT)
Age: 23
Setting detail: OUTPATIENT SURGERY
Discharge: HOME | End: 2023-10-26
Attending: OBSTETRICS & GYNECOLOGY | Admitting: OBSTETRICS & GYNECOLOGY
Payer: COMMERCIAL

## 2023-10-26 ENCOUNTER — ANESTHESIA (OUTPATIENT)
Dept: OPERATING ROOM | Age: 23
End: 2023-10-26
Payer: COMMERCIAL

## 2023-10-26 VITALS
SYSTOLIC BLOOD PRESSURE: 101 MMHG | HEART RATE: 87 BPM | WEIGHT: 119 LBS | OXYGEN SATURATION: 98 % | HEIGHT: 63 IN | DIASTOLIC BLOOD PRESSURE: 73 MMHG | TEMPERATURE: 98.1 F | BODY MASS INDEX: 21.09 KG/M2 | RESPIRATION RATE: 15 BRPM

## 2023-10-26 DIAGNOSIS — R10.2 PELVIC PAIN: ICD-10-CM

## 2023-10-26 DIAGNOSIS — Z98.890 S/P LAPAROSCOPY: Primary | ICD-10-CM

## 2023-10-26 DIAGNOSIS — N93.9 ABNORMAL UTERINE BLEEDING: ICD-10-CM

## 2023-10-26 LAB
ABO + RH BLD: NORMAL
ANION GAP SERPL CALCULATED.3IONS-SCNC: 9 MMOL/L (ref 3–16)
BLD GP AB SCN SERPL QL: NORMAL
BUN SERPL-MCNC: 12 MG/DL (ref 7–20)
CALCIUM SERPL-MCNC: 9.4 MG/DL (ref 8.3–10.6)
CHLORIDE SERPL-SCNC: 104 MMOL/L (ref 99–110)
CO2 SERPL-SCNC: 26 MMOL/L (ref 21–32)
CREAT SERPL-MCNC: 0.6 MG/DL (ref 0.6–1.1)
DEPRECATED RDW RBC AUTO: 12.9 % (ref 12.4–15.4)
GFR SERPLBLD CREATININE-BSD FMLA CKD-EPI: >60 ML/MIN/{1.73_M2}
GLUCOSE SERPL-MCNC: 104 MG/DL (ref 70–99)
HCG UR QL: NEGATIVE
HCT VFR BLD AUTO: 40.7 % (ref 36–48)
HGB BLD-MCNC: 14 G/DL (ref 12–16)
MCH RBC QN AUTO: 30.9 PG (ref 26–34)
MCHC RBC AUTO-ENTMCNC: 34.4 G/DL (ref 31–36)
MCV RBC AUTO: 89.8 FL (ref 80–100)
PLATELET # BLD AUTO: 204 K/UL (ref 135–450)
PMV BLD AUTO: 9.8 FL (ref 5–10.5)
POTASSIUM SERPL-SCNC: 3.6 MMOL/L (ref 3.5–5.1)
RBC # BLD AUTO: 4.53 M/UL (ref 4–5.2)
SODIUM SERPL-SCNC: 139 MMOL/L (ref 136–145)
WBC # BLD AUTO: 6.8 K/UL (ref 4–11)

## 2023-10-26 PROCEDURE — 3600000014 HC SURGERY LEVEL 4 ADDTL 15MIN: Performed by: OBSTETRICS & GYNECOLOGY

## 2023-10-26 PROCEDURE — 3700000000 HC ANESTHESIA ATTENDED CARE: Performed by: OBSTETRICS & GYNECOLOGY

## 2023-10-26 PROCEDURE — 80048 BASIC METABOLIC PNL TOTAL CA: CPT

## 2023-10-26 PROCEDURE — 7100000010 HC PHASE II RECOVERY - FIRST 15 MIN: Performed by: OBSTETRICS & GYNECOLOGY

## 2023-10-26 PROCEDURE — 7100000001 HC PACU RECOVERY - ADDTL 15 MIN: Performed by: OBSTETRICS & GYNECOLOGY

## 2023-10-26 PROCEDURE — 6360000002 HC RX W HCPCS: Performed by: NURSE ANESTHETIST, CERTIFIED REGISTERED

## 2023-10-26 PROCEDURE — 86850 RBC ANTIBODY SCREEN: CPT

## 2023-10-26 PROCEDURE — 3600000004 HC SURGERY LEVEL 4 BASE: Performed by: OBSTETRICS & GYNECOLOGY

## 2023-10-26 PROCEDURE — 2500000003 HC RX 250 WO HCPCS: Performed by: OBSTETRICS & GYNECOLOGY

## 2023-10-26 PROCEDURE — 88300 SURGICAL PATH GROSS: CPT

## 2023-10-26 PROCEDURE — 6360000002 HC RX W HCPCS: Performed by: ANESTHESIOLOGY

## 2023-10-26 PROCEDURE — 86900 BLOOD TYPING SEROLOGIC ABO: CPT

## 2023-10-26 PROCEDURE — 7100000000 HC PACU RECOVERY - FIRST 15 MIN: Performed by: OBSTETRICS & GYNECOLOGY

## 2023-10-26 PROCEDURE — 6370000000 HC RX 637 (ALT 250 FOR IP): Performed by: ANESTHESIOLOGY

## 2023-10-26 PROCEDURE — 2709999900 HC NON-CHARGEABLE SUPPLY: Performed by: OBSTETRICS & GYNECOLOGY

## 2023-10-26 PROCEDURE — 2500000003 HC RX 250 WO HCPCS: Performed by: NURSE ANESTHETIST, CERTIFIED REGISTERED

## 2023-10-26 PROCEDURE — 49402 REMOVE FOREIGN BODY ADBOMEN: CPT | Performed by: OBSTETRICS & GYNECOLOGY

## 2023-10-26 PROCEDURE — 7100000011 HC PHASE II RECOVERY - ADDTL 15 MIN: Performed by: OBSTETRICS & GYNECOLOGY

## 2023-10-26 PROCEDURE — 2580000003 HC RX 258: Performed by: ANESTHESIOLOGY

## 2023-10-26 PROCEDURE — 3700000001 HC ADD 15 MINUTES (ANESTHESIA): Performed by: OBSTETRICS & GYNECOLOGY

## 2023-10-26 PROCEDURE — 85027 COMPLETE CBC AUTOMATED: CPT

## 2023-10-26 PROCEDURE — 84703 CHORIONIC GONADOTROPIN ASSAY: CPT

## 2023-10-26 PROCEDURE — 86901 BLOOD TYPING SEROLOGIC RH(D): CPT

## 2023-10-26 RX ORDER — ONDANSETRON 2 MG/ML
INJECTION INTRAMUSCULAR; INTRAVENOUS PRN
Status: DISCONTINUED | OUTPATIENT
Start: 2023-10-26 | End: 2023-10-26 | Stop reason: SDUPTHER

## 2023-10-26 RX ORDER — PROPOFOL 10 MG/ML
INJECTION, EMULSION INTRAVENOUS PRN
Status: DISCONTINUED | OUTPATIENT
Start: 2023-10-26 | End: 2023-10-26 | Stop reason: SDUPTHER

## 2023-10-26 RX ORDER — SODIUM CHLORIDE 0.9 % (FLUSH) 0.9 %
5-40 SYRINGE (ML) INJECTION EVERY 12 HOURS SCHEDULED
Status: DISCONTINUED | OUTPATIENT
Start: 2023-10-26 | End: 2023-10-26 | Stop reason: HOSPADM

## 2023-10-26 RX ORDER — LIDOCAINE HYDROCHLORIDE 10 MG/ML
1 INJECTION, SOLUTION EPIDURAL; INFILTRATION; INTRACAUDAL; PERINEURAL
Status: DISCONTINUED | OUTPATIENT
Start: 2023-10-26 | End: 2023-10-26 | Stop reason: HOSPADM

## 2023-10-26 RX ORDER — MIDAZOLAM HYDROCHLORIDE 1 MG/ML
INJECTION INTRAMUSCULAR; INTRAVENOUS PRN
Status: DISCONTINUED | OUTPATIENT
Start: 2023-10-26 | End: 2023-10-26 | Stop reason: SDUPTHER

## 2023-10-26 RX ORDER — SODIUM CHLORIDE, SODIUM LACTATE, POTASSIUM CHLORIDE, CALCIUM CHLORIDE 600; 310; 30; 20 MG/100ML; MG/100ML; MG/100ML; MG/100ML
INJECTION, SOLUTION INTRAVENOUS CONTINUOUS
Status: DISCONTINUED | OUTPATIENT
Start: 2023-10-26 | End: 2023-10-26 | Stop reason: HOSPADM

## 2023-10-26 RX ORDER — PROCHLORPERAZINE EDISYLATE 5 MG/ML
5 INJECTION INTRAMUSCULAR; INTRAVENOUS
Status: DISCONTINUED | OUTPATIENT
Start: 2023-10-26 | End: 2023-10-26 | Stop reason: HOSPADM

## 2023-10-26 RX ORDER — FENTANYL CITRATE 50 UG/ML
INJECTION, SOLUTION INTRAMUSCULAR; INTRAVENOUS PRN
Status: DISCONTINUED | OUTPATIENT
Start: 2023-10-26 | End: 2023-10-26 | Stop reason: SDUPTHER

## 2023-10-26 RX ORDER — IPRATROPIUM BROMIDE AND ALBUTEROL SULFATE 2.5; .5 MG/3ML; MG/3ML
1 SOLUTION RESPIRATORY (INHALATION)
Status: DISCONTINUED | OUTPATIENT
Start: 2023-10-26 | End: 2023-10-26 | Stop reason: HOSPADM

## 2023-10-26 RX ORDER — BUPIVACAINE HYDROCHLORIDE AND EPINEPHRINE 5; 5 MG/ML; UG/ML
INJECTION, SOLUTION PERINEURAL PRN
Status: DISCONTINUED | OUTPATIENT
Start: 2023-10-26 | End: 2023-10-26 | Stop reason: ALTCHOICE

## 2023-10-26 RX ORDER — ROCURONIUM BROMIDE 10 MG/ML
INJECTION, SOLUTION INTRAVENOUS PRN
Status: DISCONTINUED | OUTPATIENT
Start: 2023-10-26 | End: 2023-10-26 | Stop reason: SDUPTHER

## 2023-10-26 RX ORDER — IBUPROFEN 600 MG/1
600 TABLET ORAL 3 TIMES DAILY PRN
Qty: 60 TABLET | Refills: 0 | Status: SHIPPED | OUTPATIENT
Start: 2023-10-26

## 2023-10-26 RX ORDER — LIDOCAINE HYDROCHLORIDE 20 MG/ML
INJECTION, SOLUTION INFILTRATION; PERINEURAL PRN
Status: DISCONTINUED | OUTPATIENT
Start: 2023-10-26 | End: 2023-10-26 | Stop reason: SDUPTHER

## 2023-10-26 RX ORDER — ACETAMINOPHEN 500 MG
1000 TABLET ORAL ONCE
Status: COMPLETED | OUTPATIENT
Start: 2023-10-26 | End: 2023-10-26

## 2023-10-26 RX ORDER — KETOROLAC TROMETHAMINE 30 MG/ML
INJECTION, SOLUTION INTRAMUSCULAR; INTRAVENOUS PRN
Status: DISCONTINUED | OUTPATIENT
Start: 2023-10-26 | End: 2023-10-26 | Stop reason: SDUPTHER

## 2023-10-26 RX ORDER — DEXAMETHASONE SODIUM PHOSPHATE 10 MG/ML
INJECTION INTRAMUSCULAR; INTRAVENOUS PRN
Status: DISCONTINUED | OUTPATIENT
Start: 2023-10-26 | End: 2023-10-26 | Stop reason: SDUPTHER

## 2023-10-26 RX ORDER — SODIUM CHLORIDE 0.9 % (FLUSH) 0.9 %
5-40 SYRINGE (ML) INJECTION PRN
Status: DISCONTINUED | OUTPATIENT
Start: 2023-10-26 | End: 2023-10-26 | Stop reason: HOSPADM

## 2023-10-26 RX ORDER — TRAMADOL HYDROCHLORIDE 50 MG/1
100 TABLET ORAL ONCE
Status: COMPLETED | OUTPATIENT
Start: 2023-10-26 | End: 2023-10-26

## 2023-10-26 RX ORDER — OXYCODONE HYDROCHLORIDE 5 MG/1
5 TABLET ORAL
Status: DISCONTINUED | OUTPATIENT
Start: 2023-10-26 | End: 2023-10-26 | Stop reason: HOSPADM

## 2023-10-26 RX ORDER — MEPERIDINE HYDROCHLORIDE 50 MG/ML
12.5 INJECTION INTRAMUSCULAR; INTRAVENOUS; SUBCUTANEOUS EVERY 5 MIN PRN
Status: DISCONTINUED | OUTPATIENT
Start: 2023-10-26 | End: 2023-10-26 | Stop reason: HOSPADM

## 2023-10-26 RX ORDER — TRAMADOL HYDROCHLORIDE 50 MG/1
50 TABLET ORAL EVERY 8 HOURS PRN
Qty: 12 TABLET | Refills: 0 | Status: SHIPPED | OUTPATIENT
Start: 2023-10-26 | End: 2023-10-31

## 2023-10-26 RX ORDER — SODIUM CHLORIDE 9 MG/ML
INJECTION, SOLUTION INTRAVENOUS PRN
Status: DISCONTINUED | OUTPATIENT
Start: 2023-10-26 | End: 2023-10-26 | Stop reason: HOSPADM

## 2023-10-26 RX ORDER — OXYCODONE HYDROCHLORIDE 5 MG/1
10 TABLET ORAL PRN
Status: DISCONTINUED | OUTPATIENT
Start: 2023-10-26 | End: 2023-10-26 | Stop reason: HOSPADM

## 2023-10-26 RX ORDER — ONDANSETRON 2 MG/ML
4 INJECTION INTRAMUSCULAR; INTRAVENOUS
Status: DISCONTINUED | OUTPATIENT
Start: 2023-10-26 | End: 2023-10-26 | Stop reason: HOSPADM

## 2023-10-26 RX ADMIN — ONDANSETRON 4 MG: 2 INJECTION INTRAMUSCULAR; INTRAVENOUS at 08:46

## 2023-10-26 RX ADMIN — SUGAMMADEX 125 MG: 100 INJECTION, SOLUTION INTRAVENOUS at 09:25

## 2023-10-26 RX ADMIN — ACETAMINOPHEN 1000 MG: 500 TABLET ORAL at 07:57

## 2023-10-26 RX ADMIN — TRAMADOL HYDROCHLORIDE 100 MG: 50 TABLET ORAL at 10:07

## 2023-10-26 RX ADMIN — HYDROMORPHONE HYDROCHLORIDE 0.25 MG: 1 INJECTION, SOLUTION INTRAMUSCULAR; INTRAVENOUS; SUBCUTANEOUS at 09:40

## 2023-10-26 RX ADMIN — ROCURONIUM BROMIDE 40 MG: 50 INJECTION, SOLUTION INTRAVENOUS at 08:37

## 2023-10-26 RX ADMIN — KETOROLAC TROMETHAMINE 30 MG: 30 INJECTION, SOLUTION INTRAMUSCULAR; INTRAVENOUS at 09:14

## 2023-10-26 RX ADMIN — DEXAMETHASONE SODIUM PHOSPHATE 10 MG: 10 INJECTION INTRAMUSCULAR; INTRAVENOUS at 08:46

## 2023-10-26 RX ADMIN — FENTANYL CITRATE 50 MCG: 50 INJECTION, SOLUTION INTRAMUSCULAR; INTRAVENOUS at 08:50

## 2023-10-26 RX ADMIN — LIDOCAINE HYDROCHLORIDE 80 MG: 20 INJECTION, SOLUTION INFILTRATION; PERINEURAL at 08:36

## 2023-10-26 RX ADMIN — PROPOFOL 170 MG: 10 INJECTION, EMULSION INTRAVENOUS at 08:36

## 2023-10-26 RX ADMIN — FENTANYL CITRATE 50 MCG: 50 INJECTION, SOLUTION INTRAMUSCULAR; INTRAVENOUS at 08:36

## 2023-10-26 RX ADMIN — MIDAZOLAM 2 MG: 1 INJECTION INTRAMUSCULAR; INTRAVENOUS at 08:32

## 2023-10-26 RX ADMIN — SODIUM CHLORIDE, POTASSIUM CHLORIDE, SODIUM LACTATE AND CALCIUM CHLORIDE: 600; 310; 30; 20 INJECTION, SOLUTION INTRAVENOUS at 07:30

## 2023-10-26 ASSESSMENT — PAIN SCALES - GENERAL
PAINLEVEL_OUTOF10: 5
PAINLEVEL_OUTOF10: 5
PAINLEVEL_OUTOF10: 0
PAINLEVEL_OUTOF10: 6
PAINLEVEL_OUTOF10: 6
PAINLEVEL_OUTOF10: 5
PAINLEVEL_OUTOF10: 6
PAINLEVEL_OUTOF10: 6
PAINLEVEL_OUTOF10: 5
PAINLEVEL_OUTOF10: 6
PAINLEVEL_OUTOF10: 5

## 2023-10-26 ASSESSMENT — PAIN DESCRIPTION - DESCRIPTORS
DESCRIPTORS: DISCOMFORT
DESCRIPTORS: CRAMPING

## 2023-10-26 ASSESSMENT — PAIN DESCRIPTION - ORIENTATION
ORIENTATION: LEFT
ORIENTATION: ANTERIOR

## 2023-10-26 ASSESSMENT — PAIN DESCRIPTION - LOCATION
LOCATION: ABDOMEN
LOCATION: ABDOMEN

## 2023-10-26 ASSESSMENT — PAIN - FUNCTIONAL ASSESSMENT: PAIN_FUNCTIONAL_ASSESSMENT: 0-10

## 2023-10-26 ASSESSMENT — LIFESTYLE VARIABLES: SMOKING_STATUS: 1

## 2023-10-26 NOTE — PROGRESS NOTES
Pt arrived from OR to PACU, reports received per CRNA and OR RN. See flowsheet for VS and assessments.

## 2023-10-26 NOTE — OP NOTE
the case. Weighted speculum was placed in the posterior aspect of the patient's vagina and the anterior lip of the cervix was grasped using a single-tooth tenaculum. [place] uterine manipulator was placed to allow for uterine manipulation. Speculum was removed. Legs were lowered and gloves were changed    Attention was turned to the abdomen. The infraumbilical region was infiltrated with a solution of half percent Marcaine with epinephrine. A 5 mm incision was created in the infraumbilical fold. A 5 mm bladeless trocar was inserted under direct laparoscopic visualization difficulty. CO2 gas was applied pneumoperitoneum obtained. Inspection beneath the trocar insertion site was noted to be free of trauma. Patient was placed in the Trendelenburg position. Abdominal pelvic survey was performed with the findings as noted above. Bilateral lower quadrant trocars were placed at this time. Skin was infiltrated with solution of half percent Marcaine with epinephrine, a 5 mm incision was created using a scalpel and 5 mm bladeless trocar was inserted under direct laparoscopic visualization. The tube ligation clips were removed from the peritoneum using gentle traction with excellent hemostasis at the sites noted. A 5 mm endoscopic collection bag was inserted through the trocar and both clips were placed within the collection bag. The collection bag was removed without difficulty, and clips were sent for gross examination only. Excellent hemostasis was noted within the pelvis. Remaining lower quadrant trocar was removed under direct laparoscopic visualization. Patient was removed from the Trendelenburg position. CO2 gas was disconnected and pneumoperitoneum released. Upon attempted complete release of pneumoperitoneum the umbilical trocar was removed under direct laparoscopic visualization. Skin incisions were closed using 4-0 Monocryl. Steri-Strips and sterile bandages were applied.     The acorn uterine manipulator and tenaculum were removed from the cervix. Excellent hemostasis was noted tenaculum site. Vang catheter was removed from the bladder. All sponge, lap, and needle counts were noted be correct. The patient tolerated the procedure well. The patient was awoken from general anesthesia and taken to the postanesthesia care unit in stable condition.     Ivelisse Higgins DO

## 2023-10-26 NOTE — ANESTHESIA PRE PROCEDURE
09:30 PM    CO2 27 10/28/2022 09:30 PM    BUN 10 10/28/2022 09:30 PM    CREATININE 0.6 10/28/2022 09:30 PM    GFRAA >60 09/21/2022 03:09 PM    AGRATIO 1.7 10/28/2022 09:30 PM    LABGLOM >60 10/28/2022 09:30 PM    GLUCOSE 99 10/28/2022 09:30 PM    PROT 7.5 10/28/2022 09:30 PM    CALCIUM 10.1 10/28/2022 09:30 PM    BILITOT <0.2 10/28/2022 09:30 PM    ALKPHOS 75 10/28/2022 09:30 PM    AST 21 10/28/2022 09:30 PM    ALT 20 10/28/2022 09:30 PM       POC Tests: No results for input(s): \"POCGLU\", \"POCNA\", \"POCK\", \"POCCL\", \"POCBUN\", \"POCHEMO\", \"POCHCT\" in the last 72 hours. Coags:   Lab Results   Component Value Date/Time    PROTIME 13.3 09/06/2022 02:17 PM    INR 1.02 09/06/2022 02:17 PM    APTT 32.2 09/06/2022 02:17 PM       HCG (If Applicable):   Lab Results   Component Value Date    PREGTESTUR Negative 09/21/2022        ABGs: No results found for: \"PHART\", \"PO2ART\", \"FBS2VPD\", \"DZP2WHZ\", \"BEART\", \"Z1VXYRKL\"     Type & Screen (If Applicable):  No results found for: \"LABABO\", \"LABRH\"    Drug/Infectious Status (If Applicable):  No results found for: \"HIV\", \"HEPCAB\"    COVID-19 Screening (If Applicable): No results found for: \"COVID19\"        Anesthesia Evaluation  Patient summary reviewed no history of anesthetic complications:   Airway: Mallampati: I  TM distance: >3 FB     Mouth opening: > = 3 FB   Dental: normal exam         Pulmonary:normal exam    (+) COPD:  current smoker                           Cardiovascular:Negative CV ROS                      Neuro/Psych:   (+) headaches: migraine headaches, psychiatric history (PTSD):depression/anxiety  (Severe anxiety)             ROS comment: THC daily GI/Hepatic/Renal: Neg GI/Hepatic/Renal ROS            Endo/Other: Negative Endo/Other ROS                    Abdominal:             Vascular: negative vascular ROS. Other Findings:           Anesthesia Plan      general     ASA 2       Induction: intravenous.       Anesthetic plan and risks discussed with

## 2023-10-26 NOTE — DISCHARGE INSTRUCTIONS
ANESTHESIA DISCHARGE INSTRUCTIONS    You are under the influence of drugs- do not drink alcohol, drive a car, operate machinery(such as power tools, kitchen appliances, etc), sign legal documents, or make any important decisions for 24 hours (or while on pain medications). Children should not ride bikes or [place] or play on gym sets  for 24 hours after surgery. A responsible adult should be with you for 24 hours. Rest at home today- increase activity as tolerated. Progress slowly to a regular diet unless your physician has instructed you otherwise. Drink plenty of water. CALL YOUR DOCTOR IF YOU:  Have moderate to severe nausea or vomiting AND are unable to hold down fluids or prescribed medications. Have bright red bloody drainage from your dressing that won't stop oozing. Do not get relief with your pain medication    NORMAL (POSSIBLE) SIDE EFFECTS FROM ANESTHESIA:     Confusion, temporary memory loss, delayed reaction times in the first 24 hours  Lightheadedness, dizziness, difficulty focusing, blurred vision  Nausea/vomiting can happen  Shivering, feeling cold, sore throat, cough and muscle aches should stop within 24-48 hours  Trouble urinating - call your surgeon if it has been more than 8 hrs  Bruising or soreness at the IV site - call if it remains red, firm or there is drainage             FEMALES OF CHILDBEARING AGE WHO ARE TAKING BIRTH CONTROL PILLS:  You may have received a medication during your procedure that interferes with the   actions of birth control pills (Bridion or Emend). Use some other kind of birth control in addition to your pills, like a condom, for 1 month after your procedure to prevent unwanted pregnancy. The following instructions are to be followed if you have a known history or diagnosis of sleep apnea: For all sleep apnea patients:  ? Sleep on your side or sitting up in a chair whenever possible, especially the first 24 hours after surgery. ?  Use only medicines

## 2023-10-26 NOTE — H&P
[facility]  History and Physical Attestation    Patient seen and evaluated prior to surgery. Patient reports no changes in medical condition. There have been no changes in the patient's medications or assessment. Preoperative tests and diagnostics have been reviewed. Surgery remains indicated as noted in History and Physical (H&P). Prophylactic antibiotics, use of beta-blockers and VTE prophylaxis have been addressed/ordered as indicated. Please see H&P and orders. History and Physical performed by patient's PCP Iain Hampton CNP on 10/11/2023 with \"okay to proceed with planned procedure. \"       All questions were answered to the patient's satisfaction. Consents are signed and on chart. Will proceed with procedure as planned.       Linda Dears, DO

## 2023-10-26 NOTE — ANESTHESIA POSTPROCEDURE EVALUATION
Department of Anesthesiology  Postprocedure Note    Patient: Debra Lopez  MRN: [id number]  YOB: 2000  Date of evaluation: 10/26/2023      Procedure Summary     Date: 10/26/23 Room / Location: [place] [address] / [facility]    Anesthesia Start: 6858 Anesthesia Stop: 0250    Procedure: DIAGNOSTIC LAPAROSCOPY WITH REMOVAL OF FOREIGN BODY (Abdomen) Diagnosis:       Abnormal uterine bleeding      Pelvic pain      (Abnormal uterine bleeding [N93.9])      (Pelvic pain [R10.2])    Surgeons: Charla Gamble DO Responsible Provider: Mago Felix MD    Anesthesia Type: general ASA Status: 2          Anesthesia Type: No value filed.     Cherri Phase I: Cherri Score: 10    Cherri Phase II: Cherri Score: 10      Anesthesia Post Evaluation    Patient location during evaluation: PACU  Patient participation: complete - patient participated  Level of consciousness: awake and alert  Airway patency: patent  Nausea & Vomiting: no nausea and no vomiting  Complications: no  Cardiovascular status: hemodynamically stable  Respiratory status: acceptable  Hydration status: euvolemic  Pain management: adequate

## 2023-11-10 ENCOUNTER — OFFICE VISIT (OUTPATIENT)
Dept: OBGYN CLINIC | Age: 23
End: 2023-11-10

## 2023-11-10 VITALS
SYSTOLIC BLOOD PRESSURE: 110 MMHG | WEIGHT: 115 LBS | HEIGHT: 63 IN | TEMPERATURE: 99 F | DIASTOLIC BLOOD PRESSURE: 66 MMHG | BODY MASS INDEX: 20.38 KG/M2

## 2023-11-10 DIAGNOSIS — N89.8 VAGINAL DRYNESS: ICD-10-CM

## 2023-11-10 DIAGNOSIS — Z09 POSTOPERATIVE EXAMINATION: Primary | ICD-10-CM

## 2023-11-10 PROCEDURE — 99024 POSTOP FOLLOW-UP VISIT: CPT | Performed by: OBSTETRICS & GYNECOLOGY

## 2023-11-10 RX ORDER — ESTRADIOL 0.1 MG/G
1 CREAM VAGINAL
Qty: 42.5 G | Refills: 3 | Status: SHIPPED | OUTPATIENT
Start: 2023-11-10

## 2023-11-20 ENCOUNTER — TELEPHONE (OUTPATIENT)
Dept: PRIMARY CARE CLINIC | Age: 23
End: 2023-11-20

## 2023-11-20 NOTE — TELEPHONE ENCOUNTER
Pt called the referral to neurologist doesn't take pt insurance. Pls place new referral to some place else.  Pls call pt when referral is placed

## 2024-01-04 ENCOUNTER — PATIENT MESSAGE (OUTPATIENT)
Dept: OBGYN CLINIC | Age: 24
End: 2024-01-04

## 2024-01-04 DIAGNOSIS — R30.0 DYSURIA: Primary | ICD-10-CM

## 2024-01-04 NOTE — TELEPHONE ENCOUNTER
Pt called office to schedule lab. She says she is unable to come in office until 1/9/24 due to Her availability.. She is currently having urinary frequency and urgency. Stated her home test was positive. Denies back pain or fever. Pt advised if she has back pain or Fever develop prior to coming in she would need ER evaluation. Routing as BRITTNEY

## 2024-01-05 NOTE — TELEPHONE ENCOUNTER
"Subjective:      Kelvin aMlagon is a 23 y.o. female who presents with Botox Injection (Intractable migraine with aura without status migrainosus)            HPI  Here for initiation of Botox.    Took ibuprofen 600mg this morning.      Current Outpatient Prescriptions   Medication Sig Dispense Refill   • SUMAtriptan (IMITREX) 50 MG Tab Take 1 Tab by mouth Once PRN for Migraine for up to 90 doses. 10 Tab 2   • Cranberry 1000 MG Cap Take  by mouth.     • HOMEOPATHIC PRODUCTS PO Take  by mouth.     • venlafaxine XR (EFFEXOR-XR) 150 MG extended-release capsule Take 1 Cap by mouth every day. 30 Cap 5   • EPINEPHrine HCl (EPINEPHRINE 1 MG/ML,1:1000,) 1 MG/ML Solution 0.5 mg by Intramuscular route Once.     • fluconazole (DIFLUCAN) 150 MG tablet Take 1 Tab by mouth every day. (Patient not taking: Reported on 4/16/2019) 1 Tab 0     Current Facility-Administered Medications   Medication Dose Route Frequency Provider Last Rate Last Dose   • onabotulinum toxin type A SOLR 155 Units  155 Units Injection Once LARRY Hubbard       Objective:     /60 (BP Location: Left arm, Patient Position: Sitting, BP Cuff Size: Adult)   Pulse 85   Resp 16   Ht 1.676 m (5' 5.98\")   Wt 80.7 kg (178 lb)   SpO2 98%   BMI 28.74 kg/m²      Physical Exam            Assessment/Plan:     Chronic Migraine:  Botox therapy has reduced patient’s migraines by more than 7 days and/or 100 hours per month.     I treated this patient in clinic today with BotoxA 155 units according to the dosing/injection paradigm currently mandated by the FDA for the management of chronic migraine. Specifically, I injected 5 units to the procerus, 5 units to the corrugators bilaterally, a total of 20 units to the frontalis musculature, 20 units to the temporalis bilaterally, 15 units to the occipitalis bilaterally, 10 units to the cervical paraspinals bilaterally and 15 units to the trapezius musculature bilaterally. The remainder of the " Is she able to stop by an outpatient lab earlier to that.  I am happy to send something in for her, however it is best to know what exactly we are treating, the at home tests just tell us there could be a UTI, not what bacteria we are treating unfortunately.     Botox 200 units mixed but not administered was discarded as wastage per FDA guidelines.     Education/counseling/reduction in medications if pt has medication overuse headache; Frequency of headaches is >15 days monthly with at least 8 migraines monthly; Migraines include at least two of the following: worsened with activity or avoidance of activity with migraines (ie they go lie down), moderate to severe pain intensity, pulsing headache, unilateral headache AND they have either nausea or vomiting OR sensitivity to light and sound.     Although this patient is responding to botox they are NOT migraine free, however, and it is my recommendation Botox be continued at this time.    This patient has chronic daily migraines, defined as having 15 or more headaches days per month, 8 of which are migraines, over a minimum of the last three months. Episodes last more than 4 hours (untreated).  Pt has 2 or more of following (see initial note) -  Headache worsened with activity, pain is moderate to severe intensity, pulsing in nature, unilateral and patient either has nausea/vomiting OR sensitivity to light and sound.  This patient does/does not also have medication overuse headache.  However our plan to address this includes education, occipital nerve shots, restricting use as directed.    New Rx for Zomig ZMT provided.  Advised to combine with NSAID and/or caffeine of choice for abortive treatment.    Return for follow-up in 3 months for 2nd set of Botox and evaluation of need for Botox.

## 2024-01-09 ENCOUNTER — NURSE ONLY (OUTPATIENT)
Dept: OBGYN CLINIC | Age: 24
End: 2024-01-09

## 2024-01-12 NOTE — TELEPHONE ENCOUNTER
Patient calling d/t she was waiting on call for her urine culture from 1/9/24, and has not received a call yet. Informed patient there are no orders for 1/9/24, I do see the conversatio about needing it. Apologized to Patient and scheduled her for Wed. 1/17/24 to leave a sample, however if she is able to get to a lab over the weekend she will do that. Please see pending orders for out patient and in patient.     Routing to Dr. Galvez.

## 2024-01-17 ENCOUNTER — NURSE ONLY (OUTPATIENT)
Dept: OBGYN CLINIC | Age: 24
End: 2024-01-17

## 2024-01-17 DIAGNOSIS — R30.0 DYSURIA: ICD-10-CM

## 2024-01-18 LAB
BACTERIA URNS QL MICRO: ABNORMAL /HPF
BILIRUB UR QL STRIP.AUTO: NEGATIVE
CLARITY UR: ABNORMAL
COLOR UR: YELLOW
EPI CELLS #/AREA URNS AUTO: 5 /HPF (ref 0–5)
GLUCOSE UR STRIP.AUTO-MCNC: NEGATIVE MG/DL
HGB UR QL STRIP.AUTO: ABNORMAL
HYALINE CASTS #/AREA URNS AUTO: 1 /LPF (ref 0–8)
KETONES UR STRIP.AUTO-MCNC: NEGATIVE MG/DL
LEUKOCYTE ESTERASE UR QL STRIP.AUTO: ABNORMAL
NITRITE UR QL STRIP.AUTO: NEGATIVE
PH UR STRIP.AUTO: 5.5 [PH] (ref 5–8)
PROT UR STRIP.AUTO-MCNC: 30 MG/DL
RBC CLUMPS #/AREA URNS AUTO: 2 /HPF (ref 0–4)
SP GR UR STRIP.AUTO: 1.02 (ref 1–1.03)
UA DIPSTICK W REFLEX MICRO PNL UR: YES
URN SPEC COLLECT METH UR: ABNORMAL
UROBILINOGEN UR STRIP-ACNC: 1 E.U./DL
WBC #/AREA URNS AUTO: 196 /HPF (ref 0–5)

## 2024-01-19 LAB
BACTERIA UR CULT: ABNORMAL
ORGANISM: ABNORMAL

## 2024-01-19 RX ORDER — CEPHALEXIN 500 MG/1
500 CAPSULE ORAL 2 TIMES DAILY
Qty: 14 CAPSULE | Refills: 0 | Status: SHIPPED | OUTPATIENT
Start: 2024-01-19 | End: 2024-01-26

## 2024-02-09 NOTE — PROGRESS NOTES
Social History     Tobacco Use    Smoking status: Every Day     Types: E-Cigarettes    Smokeless tobacco: Never   Substance Use Topics    Alcohol use: Not Currently     Comment: occ        CARE TEAM  Patient Care Team:  Shannon Taylor APRN - CNP as PCP - General (Family Nurse Practitioner)  Shannon Taylor APRN - CNP as PCP - Empaneled Provider    Electronically signed by VLAD Tenorio CNP on 2/14/2024 at 6:19 PM     This dictation was generated by voice recognition computer software.  Although all attempts are made to edit the dictation for accuracy, there may be errors in the transcription that are not intended.

## 2024-02-14 ENCOUNTER — OFFICE VISIT (OUTPATIENT)
Dept: PRIMARY CARE CLINIC | Age: 24
End: 2024-02-14
Payer: COMMERCIAL

## 2024-02-14 VITALS
TEMPERATURE: 98.1 F | DIASTOLIC BLOOD PRESSURE: 56 MMHG | BODY MASS INDEX: 20.16 KG/M2 | OXYGEN SATURATION: 99 % | HEART RATE: 73 BPM | RESPIRATION RATE: 16 BRPM | SYSTOLIC BLOOD PRESSURE: 124 MMHG | WEIGHT: 112 LBS

## 2024-02-14 DIAGNOSIS — R42 DIZZINESS: Primary | ICD-10-CM

## 2024-02-14 DIAGNOSIS — R00.2 PALPITATIONS: ICD-10-CM

## 2024-02-14 DIAGNOSIS — G89.29 CHRONIC PELVIC PAIN IN FEMALE: ICD-10-CM

## 2024-02-14 DIAGNOSIS — R10.2 CHRONIC PELVIC PAIN IN FEMALE: ICD-10-CM

## 2024-02-14 DIAGNOSIS — G43.E19 INTRACTABLE CHRONIC MIGRAINE WITH AURA AND WITHOUT STATUS MIGRAINOSUS: ICD-10-CM

## 2024-02-14 DIAGNOSIS — Z13.31 POSITIVE DEPRESSION SCREENING: ICD-10-CM

## 2024-02-14 DIAGNOSIS — R00.0 TACHYCARDIA: ICD-10-CM

## 2024-02-14 PROCEDURE — 99214 OFFICE O/P EST MOD 30 MIN: CPT | Performed by: NURSE PRACTITIONER

## 2024-02-14 RX ORDER — MECLIZINE HYDROCHLORIDE 25 MG/1
25 TABLET ORAL 3 TIMES DAILY PRN
Qty: 30 TABLET | Refills: 0 | Status: SHIPPED | OUTPATIENT
Start: 2024-02-14 | End: 2024-02-24

## 2024-02-14 NOTE — PATIENT INSTRUCTIONS
[facility]  Saturday 8 AM to 12 PM  [address]  [address]  [place] [address]  Phone: [phone]  Fax: [phone]    [place]  Saturday 8 AM to 12 PM  [address]  [address]  [place] [address]  Phone: [phone]  Fax: [phone]    [place]  Saturday 8 AM to 12 PM  [address] [place] [address]  Phone: [phone]  Fax: [phone]    [place]  Saturday 8 AM to 12 PM  [address]  [address]  [place] [address]  Phone: [phone]  Fax: [phone]    [facility]  Saturday 8 AM to 12 PM  [address]  [address]  [place] [address]  Phone: [phone]  Fax: [phone]

## 2024-02-28 ENCOUNTER — TELEPHONE (OUTPATIENT)
Dept: CARDIOLOGY CLINIC | Age: 24
End: 2024-02-28

## 2024-02-28 NOTE — PROGRESS NOTES
[facility]   Electrophysiology  Office Visit  New Patient  Date: 3/6/2024    Chief Complaint   Patient presents with    Palpitations    Loss of Consciousness    Dizziness    Tachycardia     Cardiac HX: Shasha Barclay is a 23 y.o. woman with a h/o PTSD, anxiety, depression, palpitations referred by PCP for possible POTS.     Interval History/HPI: Patient is here for follow-up for tachycardia, palpitations and syncope.  She has been referred by her PCP for possible POTS.  Patient states that her symptoms started approximately 3 years ago after the birth of her daughter.  She notes that she gets dizzy and lightheaded going from sitting to standing and bending over.  She has passed out about 4-5 times over the past 3 years.  He has not injured herself when she is passed out.  She has had a couple witnessed events by her boyfriend.  There has been no seizure activity.  She states that after she has a syncopal event she feels fatigued for the rest of the day.  The majority the time she can board these episodes by sitting down if she can feel them coming on.  She does occasionally feel heart racing and palpitations at the same time.  She does feel fatigued the rest of the day after syncopal or near syncopal event.  She states that she did have seizure activity in the past when she has had blood work done.  She does get dizzy and lightheaded when she has blood work drawn.  She also is very intolerant of the heat.  She drinks approximately 3-4 bottles of water a day along with orange juice and milk.  She does not drink coffee but does drink caffeinated tea.  She does not drink soda or alcohol.  She states that over the past several months she is also been having issues with eating.  She states that if she eats a larger meal she ends up having abdominal discomfort and has had to eat small meals to the point that it is affecting her weight.  She states that she weighed about 142 pounds and now she is down to 112.

## 2024-03-06 ENCOUNTER — OFFICE VISIT (OUTPATIENT)
Dept: CARDIOLOGY CLINIC | Age: 24
End: 2024-03-06
Payer: COMMERCIAL

## 2024-03-06 VITALS
WEIGHT: 111.8 LBS | BODY MASS INDEX: 20.12 KG/M2 | HEART RATE: 67 BPM | SYSTOLIC BLOOD PRESSURE: 102 MMHG | DIASTOLIC BLOOD PRESSURE: 76 MMHG

## 2024-03-06 DIAGNOSIS — R00.2 PALPITATION: Primary | ICD-10-CM

## 2024-03-06 DIAGNOSIS — R00.0 TACHYCARDIA: ICD-10-CM

## 2024-03-06 DIAGNOSIS — R55 VASOVAGAL SYNCOPE: ICD-10-CM

## 2024-03-06 DIAGNOSIS — R55 NEAR SYNCOPE: ICD-10-CM

## 2024-03-06 DIAGNOSIS — R42 LIGHTHEADEDNESS: ICD-10-CM

## 2024-03-06 PROCEDURE — 93000 ELECTROCARDIOGRAM COMPLETE: CPT | Performed by: NURSE PRACTITIONER

## 2024-03-06 PROCEDURE — 99205 OFFICE O/P NEW HI 60 MIN: CPT | Performed by: NURSE PRACTITIONER

## 2024-03-06 NOTE — PATIENT INSTRUCTIONS
contraction.  This can actually make the problem worse.  As the heart beats faster and stronger it sends the wrong message to the brain that the ventricles are filled with blood when they are not.  Other receptors send wrong messages that the blood pressure is too high when in fact the ventricle is not full and the blood pressure is actually low.  This in turn causes the brain to slow the heart rate and lower the blood pressure.  This causes the blood pressure to drop even lower and the risk of fainting increases.    For most people, this process of returning the blood to the central part of the body works efficiently and without our notice, just like breathing.  When this process does not work well, the following symptoms can be experienced: dizziness, lightheadedness, fainting, chest pain, heart racing, sweating, feeling too hot or too cold, rapid swings in body temperature, nausea, abdominal pain, GI problems, muscle aches or pains, fatigue, depression or inappropriate or exaggerated emotional responses to name a few.    Because there are so many factors that effect blood return to the central organs, and infected person may be fine one minute and had significant trouble the next.  Also, if someone has had this condition for a long time, they may not know what their body does is abnormal - sort of like not noticing when your eyesight decreases as you age.    You Are Not Alone    Many people have been told they are\" crazy\" because a doctor cannot find anything physically wrong to explain the symptoms they are having.  Many doctors do not know about this condition as it was not well understood until 1989, and at that time only the severe cases were identified - those people who passed out frequently.  Now we note that you can have this condition even if he did not pass out.  Some people get Neurocardiogenic Syncope abruptly in their teen years and it can leave just as abruptly.  Some people have this forever but 
losing balance/decreased step length/decreased weight-shifting ability

## 2024-03-12 ENCOUNTER — HOSPITAL ENCOUNTER (OUTPATIENT)
Dept: NON INVASIVE DIAGNOSTICS | Age: 24
Discharge: HOME | End: 2024-03-12
Payer: COMMERCIAL

## 2024-03-12 ENCOUNTER — TELEPHONE (OUTPATIENT)
Dept: CARDIOLOGY CLINIC | Age: 24
End: 2024-03-12

## 2024-03-12 DIAGNOSIS — R55 VASOVAGAL SYNCOPE: ICD-10-CM

## 2024-03-12 DIAGNOSIS — R00.2 PALPITATION: ICD-10-CM

## 2024-03-12 PROCEDURE — 93306 TTE W/DOPPLER COMPLETE: CPT

## 2024-03-12 NOTE — TELEPHONE ENCOUNTER
Patient returning call regarding Echo results. Relayed result notes to patient and she is OK with recommendations.

## 2024-03-22 SDOH — ECONOMIC STABILITY: HOUSING INSECURITY
IN THE LAST 12 MONTHS, WAS THERE A TIME WHEN YOU DID NOT HAVE A STEADY PLACE TO SLEEP OR SLEPT IN A SHELTER (INCLUDING NOW)?: PATIENT DECLINED

## 2024-03-22 SDOH — ECONOMIC STABILITY: INCOME INSECURITY: HOW HARD IS IT FOR YOU TO PAY FOR THE VERY BASICS LIKE FOOD, HOUSING, MEDICAL CARE, AND HEATING?: PATIENT DECLINED

## 2024-03-22 SDOH — ECONOMIC STABILITY: FOOD INSECURITY: WITHIN THE PAST 12 MONTHS, YOU WORRIED THAT YOUR FOOD WOULD RUN OUT BEFORE YOU GOT MONEY TO BUY MORE.: PATIENT DECLINED

## 2024-03-22 SDOH — ECONOMIC STABILITY: FOOD INSECURITY: WITHIN THE PAST 12 MONTHS, THE FOOD YOU BOUGHT JUST DIDN'T LAST AND YOU DIDN'T HAVE MONEY TO GET MORE.: PATIENT DECLINED

## 2024-03-22 SDOH — ECONOMIC STABILITY: TRANSPORTATION INSECURITY
IN THE PAST 12 MONTHS, HAS LACK OF TRANSPORTATION KEPT YOU FROM MEETINGS, WORK, OR FROM GETTING THINGS NEEDED FOR DAILY LIVING?: PATIENT DECLINED

## 2024-03-22 NOTE — PROGRESS NOTES
ENCOUNTER DATE: 3/25/2024     NAME: Shasha Barclay   AGE: 23 y.o.   GENDER: female   YOB: 2000    Chief Complaint   Patient presents with    Migraine    Neck Pain    Joint Pain       ASSESSMENT/PLAN:  1. Intractable chronic migraine with aura and without status migrainosus  Chronic.  Not well-controlled  Scheduled with neurology in September at   Has failed Ibuprofen, imitrex, tylenol, excedrin migraine, Nurtec QOD dosing (ineffective), amitriptyline (side effects), and duloxetine   Trial of Topamax.  Discussed medication in detail.  Advised to start with once daily dosing x 1 week and if tolerating may increase to twice daily dosing.  Recheck in 1 month.    If Topamax is ineffective, may try for Qulipta  Will try to avoid monthly injectables at this time since she has multiple sensitivities to medications in the past  - topiramate (TOPAMAX) 50 MG tablet; Take 1 tablet by mouth 2 times daily  Dispense: 60 tablet; Refill: 0    2. Neck pain  Trial of muscle relaxer for neck pain  May help with migraines  Discussed possible Fioricet, however migraines are not typically in the back of her head or in.  - methocarbamol (ROBAXIN-750) 750 MG tablet; Take 1 tablet by mouth 4 times daily for 10 days  Dispense: 40 tablet; Refill: 0    3. Arthralgia of multiple sites  Check labs today to rule out underlying rheumatology disorders  - JOSEPH Titer; Future  - Rheumatoid Factor; Future  - Sedimentation Rate; Future    4. Menstrual irregularity  Check hCG levels per patient request.  - HCG Qualitative, Serum; Future      Return in about 2 weeks (around 4/8/2024) for migraines. .     HPI:   Patient is here for a problem visit    MIGRAINES  Complains of worsening migraines  Has migraine every day.   Starts on the sides of her head, right eye.   Has light sensitivity, nausea. No vomiting.   Referred to [facility], Wouldn't take her insurance  Referred to . Apt in September.   CT of head completed 6/14/2023.

## 2024-03-25 ENCOUNTER — OFFICE VISIT (OUTPATIENT)
Dept: PRIMARY CARE CLINIC | Age: 24
End: 2024-03-25
Payer: COMMERCIAL

## 2024-03-25 VITALS
TEMPERATURE: 98 F | SYSTOLIC BLOOD PRESSURE: 118 MMHG | RESPIRATION RATE: 16 BRPM | WEIGHT: 109 LBS | BODY MASS INDEX: 19.62 KG/M2 | HEART RATE: 70 BPM | DIASTOLIC BLOOD PRESSURE: 60 MMHG | OXYGEN SATURATION: 100 %

## 2024-03-25 DIAGNOSIS — M25.50 ARTHRALGIA OF MULTIPLE SITES: ICD-10-CM

## 2024-03-25 DIAGNOSIS — M54.2 NECK PAIN: ICD-10-CM

## 2024-03-25 DIAGNOSIS — N92.6 MENSTRUAL IRREGULARITY: ICD-10-CM

## 2024-03-25 DIAGNOSIS — G43.E19 INTRACTABLE CHRONIC MIGRAINE WITH AURA AND WITHOUT STATUS MIGRAINOSUS: Primary | ICD-10-CM

## 2024-03-25 PROCEDURE — 99214 OFFICE O/P EST MOD 30 MIN: CPT | Performed by: NURSE PRACTITIONER

## 2024-03-25 RX ORDER — TOPIRAMATE 50 MG/1
50 TABLET, FILM COATED ORAL 2 TIMES DAILY
Qty: 60 TABLET | Refills: 0 | Status: SHIPPED | OUTPATIENT
Start: 2024-03-25

## 2024-03-25 RX ORDER — METHOCARBAMOL 750 MG/1
750 TABLET, FILM COATED ORAL 4 TIMES DAILY
Qty: 40 TABLET | Refills: 0 | Status: SHIPPED | OUTPATIENT
Start: 2024-03-25 | End: 2024-04-04

## 2024-03-25 ASSESSMENT — ENCOUNTER SYMPTOMS
SHORTNESS OF BREATH: 1
WHEEZING: 0
SORE THROAT: 0
NAUSEA: 0
VOMITING: 0
COUGH: 0
BACK PAIN: 1
DIARRHEA: 0
BLOOD IN STOOL: 0
CHEST TIGHTNESS: 0
CONSTIPATION: 1
ABDOMINAL PAIN: 0

## 2024-03-26 DIAGNOSIS — M25.50 ARTHRALGIA OF MULTIPLE SITES: ICD-10-CM

## 2024-03-26 DIAGNOSIS — N92.6 MENSTRUAL IRREGULARITY: ICD-10-CM

## 2024-03-26 LAB
ERYTHROCYTE [SEDIMENTATION RATE] IN BLOOD BY WESTERGREN METHOD: 2 MM/HR (ref 0–20)
HCG SERPL QL: NEGATIVE
RHEUMATOID FACT SER IA-ACNC: <10 IU/ML

## 2024-03-27 LAB — ANA SER QL IA: NEGATIVE

## 2024-04-04 ENCOUNTER — TELEPHONE (OUTPATIENT)
Dept: CARDIOLOGY CLINIC | Age: 24
End: 2024-04-04

## 2024-04-04 RX ORDER — NADOLOL 20 MG/1
10 TABLET ORAL DAILY
Qty: 30 TABLET | Refills: 5 | Status: SHIPPED | OUTPATIENT
Start: 2024-04-04

## 2024-04-04 NOTE — TELEPHONE ENCOUNTER
FW would like to start pt on nadolol 10 mg QD.    Spoke with pt and I let her know that FW would like to start her on nadolol 10 mg. Pt understood verbally.     Pt scheduled with [facility]S on 06/18/24.

## 2024-04-05 DIAGNOSIS — R00.2 PALPITATIONS: Primary | ICD-10-CM

## 2024-04-05 DIAGNOSIS — R42 DIZZINESS: ICD-10-CM

## 2024-04-05 LAB
ANION GAP SERPL CALCULATED.3IONS-SCNC: 13 MMOL/L (ref 3–16)
BUN SERPL-MCNC: 13 MG/DL (ref 7–20)
CALCIUM SERPL-MCNC: 10 MG/DL (ref 8.3–10.6)
CHLORIDE SERPL-SCNC: 104 MMOL/L (ref 99–110)
CO2 SERPL-SCNC: 24 MMOL/L (ref 21–32)
CREAT SERPL-MCNC: 0.7 MG/DL (ref 0.6–1.1)
DEPRECATED RDW RBC AUTO: 12.5 % (ref 12.4–15.4)
GFR SERPLBLD CREATININE-BSD FMLA CKD-EPI: >90 ML/MIN/{1.73_M2}
GLUCOSE SERPL-MCNC: 111 MG/DL (ref 70–99)
HCT VFR BLD AUTO: 40.2 % (ref 36–48)
HGB BLD-MCNC: 13.8 G/DL (ref 12–16)
MCH RBC QN AUTO: 30.7 PG (ref 26–34)
MCHC RBC AUTO-ENTMCNC: 34.3 G/DL (ref 31–36)
MCV RBC AUTO: 89.4 FL (ref 80–100)
PLATELET # BLD AUTO: 209 K/UL (ref 135–450)
PMV BLD AUTO: 10.3 FL (ref 5–10.5)
POTASSIUM SERPL-SCNC: 3.9 MMOL/L (ref 3.5–5.1)
RBC # BLD AUTO: 4.5 M/UL (ref 4–5.2)
SODIUM SERPL-SCNC: 141 MMOL/L (ref 136–145)
TSH SERPL DL<=0.005 MIU/L-ACNC: 0.97 UIU/ML (ref 0.27–4.2)
WBC # BLD AUTO: 6.4 K/UL (ref 4–11)

## 2024-04-05 NOTE — PROGRESS NOTES
for tremors, seizures, syncope, facial asymmetry, speech difficulty, weakness and numbness.   Hematological:  Does not bruise/bleed easily.   Psychiatric/Behavioral:  Positive for dysphoric mood. Negative for agitation and sleep disturbance. The patient is nervous/anxious.         H/o anxiety, depression PTSD       Prior to Visit Medications    Medication Sig Taking? Authorizing Provider   Atogepant (QULIPTA) 30 MG TABS Take 1 tab PO daily for chronic migraine prevention. Yes Shannon Taylor APRN - CNP   tiZANidine (ZANAFLEX) 4 MG tablet Take 1 tablet by mouth every 8 hours as needed (neck, back pain) Yes Shannon Taylor APRN - CNP   nadolol (CORGARD) 20 MG tablet Take 0.5 tablets by mouth daily  Sheila Truong APRN - CNP   topiramate (TOPAMAX) 50 MG tablet Take 1 tablet by mouth 2 times daily  Shannon Taylor APRN - CNP   estradiol (ESTRACE VAGINAL) 0.1 MG/GM vaginal cream Place 1 g vaginally three times a week  Neda Galvez DO   ibuprofen (ADVIL;MOTRIN) 600 MG tablet Take 1 tablet by mouth 3 times daily as needed for Pain  Neda Galvez DO   valACYclovir (VALTREX) 500 MG tablet Take 1 tablet by mouth daily  Shannon Taylor APRN - CNP   traZODone (DESYREL) 100 MG tablet Take 0.5 tablets by mouth nightly as needed for Sleep  Marin Horan MD       Social History     Tobacco Use    Smoking status: Every Day     Types: E-Cigarettes    Smokeless tobacco: Never   Vaping Use    Vaping Use: Every day    Substances: Nicotine, THC, CBD, Flavoring    Devices: Disposable   Substance Use Topics    Alcohol use: Not Currently     Comment: occ    Drug use: Yes     Types: Marijuana (Weed)     Comment: nightly for sleep        Allergies   Allergen Reactions    Latex Rash    Prozac [Fluoxetine] Anxiety and Rash   ,   Past Medical History:   Diagnosis Date    Bipolar 1 disorder (HCC)     Depression 2015    Herpes simplex virus (HSV) infection 06/-/2018    Migraine 2022    Postpartum depression 08/19/2019

## 2024-04-09 ENCOUNTER — TELEMEDICINE (OUTPATIENT)
Dept: PRIMARY CARE CLINIC | Age: 24
End: 2024-04-09
Payer: COMMERCIAL

## 2024-04-09 DIAGNOSIS — M54.2 NECK PAIN: ICD-10-CM

## 2024-04-09 DIAGNOSIS — G43.E19 INTRACTABLE CHRONIC MIGRAINE WITH AURA AND WITHOUT STATUS MIGRAINOSUS: Primary | ICD-10-CM

## 2024-04-09 DIAGNOSIS — R00.0 TACHYCARDIA: ICD-10-CM

## 2024-04-09 PROCEDURE — 99214 OFFICE O/P EST MOD 30 MIN: CPT | Performed by: NURSE PRACTITIONER

## 2024-04-09 RX ORDER — TIZANIDINE 4 MG/1
4 TABLET ORAL EVERY 8 HOURS PRN
Qty: 30 TABLET | Refills: 2 | Status: SHIPPED | OUTPATIENT
Start: 2024-04-09

## 2024-04-09 RX ORDER — ATOGEPANT 30 MG/1
TABLET ORAL
Qty: 30 TABLET | Refills: 2 | Status: SHIPPED | OUTPATIENT
Start: 2024-04-09

## 2024-04-09 ASSESSMENT — ENCOUNTER SYMPTOMS
WHEEZING: 0
SORE THROAT: 0
ABDOMINAL PAIN: 0
VOMITING: 0
DIARRHEA: 0
NAUSEA: 0
SHORTNESS OF BREATH: 1
CONSTIPATION: 1
BLOOD IN STOOL: 0
CHEST TIGHTNESS: 0
BACK PAIN: 1
COUGH: 0

## 2024-04-11 ENCOUNTER — TELEPHONE (OUTPATIENT)
Dept: FAMILY MEDICINE CLINIC | Age: 24
End: 2024-04-11

## 2024-04-11 NOTE — TELEPHONE ENCOUNTER
SUBMITTED PA FOR Qulipta 30MG tablets  VIA CMM Key: [id number] STATUS NOT SENT TO PLAN. HAS MIGRAINES IMPROVED SINCE THE START OF THE REQUESTED MEDICATION      FOLLOW UP DONE DAILY: IF NO RESPONSE IN 3 DAYS WE WILL REFAX FOR STATUS CHECK. IF ANOTHER 3 DAYS GOES BY WITH NO RESPONSE WILL CALL INSURANCE FOR STATUS.

## 2024-04-16 NOTE — TELEPHONE ENCOUNTER
ADDITIONAL CLINIC QUESTIONS SENT TO PLAN.      If this requires a response please respond to the pool ( P MHCX PSC MEDICATION PRE-AUTH).      Thank you please advise patient.

## 2024-04-19 NOTE — TELEPHONE ENCOUNTER
The medication was DENIED; Outcome   Deniedon April 16  Coverage is provided when the member meets the following requirements: 1. The member has a history of at least 30 days of therapy with three preferred (medication covered by the Plan) medications, which include but are not limited to: beta-blockers (such as: propranolol and timolol), tricyclic antidepressants (such as: amitriptyline and nortriptyline), anticonvulsants (such as: topiramate), and/or serotonin-norepinephrine reuptake inhibitors (such as: venlafaxine ER (extended release) capsules and duloxetine capsules (20, 30 and 60 mg); AND 2. The member has a history of at least 30 days of therapy of either Aimovig or Ajovy (both require prior authorization (approval by your plan before you can get your medication).[facility] Policy for Medical Necessity and [facility] Preferred Drug List criteria were reviewed and per [place] Administrative Code Rule 5160-1-01 (C) and 5160-26-03 (B), a medically necessary service must include: generally accepted standards of medical practice, be clinically appropriate in administration, treatment and outcome and be the lowest cost alternative to effectively treat the condition. Please contact your provider to assist you with other treatment options that might be covered under your benefit package, or other services that might be available through the community.    If you want an APPEAL; please note in this encounter what new information you would like to APPEAL with.  Once complete route back to PA POOL.    If this requires a response please respond to the pool ( P [facility] MEDICATION PRE-AUTH).      Thank you please advise patient.

## 2024-04-28 ENCOUNTER — PATIENT MESSAGE (OUTPATIENT)
Dept: PRIMARY CARE CLINIC | Age: 24
End: 2024-04-28

## 2024-04-29 ENCOUNTER — TELEPHONE (OUTPATIENT)
Dept: CARDIOLOGY CLINIC | Age: 24
End: 2024-04-29

## 2024-04-29 NOTE — TELEPHONE ENCOUNTER
Patient called stating she is taking Nadolol (Corgard) 20mg    0.5 tablet once daily - pt states she is having sharp pains that feels like its going through her heart, only lasts about 1 minute but comes & goes randomly when taking medication. Pt has had no other symptoms since starting the medication. Pt would like a call back at [phone] to see if she can change the medication or to see if she should make an appointment as soon as possible.

## 2024-04-29 NOTE — TELEPHONE ENCOUNTER
From: Shasha Barclay  To: Shannon Taylor  Sent: 4/28/2024 1:48 AM EDT  Subject: Qulipta    It was denied again, I got the same letter I got last time, about them wanting me to try a bunch of things first.

## 2024-04-29 NOTE — TELEPHONE ENCOUNTER
Spoke with patient, starting having chest pain after starting the nadolol, will hold tonight's dose and see if that helps. Any other recommendations.

## 2024-04-30 ENCOUNTER — HOSPITAL ENCOUNTER (EMERGENCY)
Age: 24
Discharge: HOME | End: 2024-04-30
Attending: INTERNAL MEDICINE
Payer: COMMERCIAL

## 2024-04-30 ENCOUNTER — APPOINTMENT (OUTPATIENT)
Dept: GENERAL RADIOLOGY | Age: 24
End: 2024-04-30
Payer: COMMERCIAL

## 2024-04-30 VITALS
BODY MASS INDEX: 18.9 KG/M2 | SYSTOLIC BLOOD PRESSURE: 106 MMHG | DIASTOLIC BLOOD PRESSURE: 69 MMHG | RESPIRATION RATE: 20 BRPM | HEART RATE: 76 BPM | WEIGHT: 105 LBS | OXYGEN SATURATION: 100 % | TEMPERATURE: 98.2 F

## 2024-04-30 DIAGNOSIS — R07.9 CHEST PAIN, UNSPECIFIED TYPE: Primary | ICD-10-CM

## 2024-04-30 LAB
AMPHETAMINES UR QL SCN>1000 NG/ML: ABNORMAL
ANION GAP SERPL CALCULATED.3IONS-SCNC: 12 MMOL/L (ref 3–16)
BARBITURATES UR QL SCN>200 NG/ML: ABNORMAL
BASOPHILS # BLD: 0 K/UL (ref 0–0.2)
BASOPHILS NFR BLD: 0.6 %
BENZODIAZ UR QL SCN>200 NG/ML: ABNORMAL
BUN SERPL-MCNC: 8 MG/DL (ref 7–20)
CALCIUM SERPL-MCNC: 9.3 MG/DL (ref 8.3–10.6)
CANNABINOIDS UR QL SCN>50 NG/ML: POSITIVE
CHLORIDE SERPL-SCNC: 102 MMOL/L (ref 99–110)
CO2 SERPL-SCNC: 21 MMOL/L (ref 21–32)
COCAINE UR QL SCN: ABNORMAL
CREAT SERPL-MCNC: <0.5 MG/DL (ref 0.6–1.1)
D DIMER: <0.27 UG/ML FEU (ref 0–0.6)
DEPRECATED RDW RBC AUTO: 13.1 % (ref 12.4–15.4)
DRUG SCREEN COMMENT UR-IMP: ABNORMAL
EOSINOPHIL # BLD: 0 K/UL (ref 0–0.6)
EOSINOPHIL NFR BLD: 0.3 %
ETHANOLAMINE SERPL-MCNC: NORMAL MG/DL (ref 0–0.08)
FENTANYL SCREEN, URINE: ABNORMAL
GFR SERPLBLD CREATININE-BSD FMLA CKD-EPI: >90 ML/MIN/{1.73_M2}
GLUCOSE SERPL-MCNC: 87 MG/DL (ref 70–99)
HCT VFR BLD AUTO: 38.9 % (ref 36–48)
HGB BLD-MCNC: 13.4 G/DL (ref 12–16)
LYMPHOCYTES # BLD: 1.3 K/UL (ref 1–5.1)
LYMPHOCYTES NFR BLD: 27.9 %
MCH RBC QN AUTO: 31.2 PG (ref 26–34)
MCHC RBC AUTO-ENTMCNC: 34.4 G/DL (ref 31–36)
MCV RBC AUTO: 90.7 FL (ref 80–100)
METHADONE UR QL SCN>300 NG/ML: ABNORMAL
MONOCYTES # BLD: 0.7 K/UL (ref 0–1.3)
MONOCYTES NFR BLD: 13.8 %
NEUTROPHILS # BLD: 2.7 K/UL (ref 1.7–7.7)
NEUTROPHILS NFR BLD: 57.4 %
OPIATES UR QL SCN>300 NG/ML: ABNORMAL
OXYCODONE UR QL SCN: ABNORMAL
PCP UR QL SCN>25 NG/ML: ABNORMAL
PH UR STRIP: 5 [PH]
PLATELET # BLD AUTO: 172 K/UL (ref 135–450)
PMV BLD AUTO: 8.9 FL (ref 5–10.5)
POTASSIUM SERPL-SCNC: 3.9 MMOL/L (ref 3.5–5.1)
RBC # BLD AUTO: 4.29 M/UL (ref 4–5.2)
SODIUM SERPL-SCNC: 135 MMOL/L (ref 136–145)
TROPONIN, HIGH SENSITIVITY: <6 NG/L (ref 0–14)
WBC # BLD AUTO: 4.8 K/UL (ref 4–11)

## 2024-04-30 PROCEDURE — 80307 DRUG TEST PRSMV CHEM ANLYZR: CPT

## 2024-04-30 PROCEDURE — 93005 ELECTROCARDIOGRAM TRACING: CPT | Performed by: INTERNAL MEDICINE

## 2024-04-30 PROCEDURE — 71045 X-RAY EXAM CHEST 1 VIEW: CPT

## 2024-04-30 PROCEDURE — 84484 ASSAY OF TROPONIN QUANT: CPT

## 2024-04-30 PROCEDURE — 6370000000 HC RX 637 (ALT 250 FOR IP): Performed by: INTERNAL MEDICINE

## 2024-04-30 PROCEDURE — 80048 BASIC METABOLIC PNL TOTAL CA: CPT

## 2024-04-30 PROCEDURE — 99285 EMERGENCY DEPT VISIT HI MDM: CPT

## 2024-04-30 PROCEDURE — 85379 FIBRIN DEGRADATION QUANT: CPT

## 2024-04-30 PROCEDURE — 85025 COMPLETE CBC W/AUTO DIFF WBC: CPT

## 2024-04-30 PROCEDURE — 82077 ASSAY SPEC XCP UR&BREATH IA: CPT

## 2024-04-30 RX ORDER — NAPROXEN 250 MG/1
250 TABLET ORAL ONCE
Status: DISCONTINUED | OUTPATIENT
Start: 2024-04-30 | End: 2024-04-30 | Stop reason: HOSPADM

## 2024-04-30 RX ORDER — NAPROXEN 250 MG/1
250 TABLET ORAL 2 TIMES DAILY WITH MEALS
Qty: 20 TABLET | Refills: 0 | Status: SHIPPED | OUTPATIENT
Start: 2024-04-30

## 2024-04-30 RX ORDER — HYDROXYZINE HYDROCHLORIDE 10 MG/1
10 TABLET, FILM COATED ORAL ONCE
Status: COMPLETED | OUTPATIENT
Start: 2024-04-30 | End: 2024-04-30

## 2024-04-30 RX ADMIN — HYDROXYZINE HYDROCHLORIDE 10 MG: 10 TABLET ORAL at 18:44

## 2024-04-30 ASSESSMENT — PAIN SCALES - GENERAL
PAINLEVEL_OUTOF10: 6
PAINLEVEL_OUTOF10: 6

## 2024-04-30 ASSESSMENT — PAIN DESCRIPTION - DESCRIPTORS: DESCRIPTORS: TIGHTNESS

## 2024-04-30 ASSESSMENT — PAIN - FUNCTIONAL ASSESSMENT: PAIN_FUNCTIONAL_ASSESSMENT: 0-10

## 2024-04-30 ASSESSMENT — PAIN DESCRIPTION - LOCATION: LOCATION: CHEST

## 2024-04-30 NOTE — ED PROVIDER NOTES
EMERGENCY MEDICINE PROVIDER NOTE    Patient Identification  Pt Name: Shasha Barclay  MRN: [id number]  Birthdate 2000  Date of evaluation: 2024  Provider: VISH WADE DO  PCP: Shannon Taylor, APRN - CNP    Chief Complaint  Chest Pain (Pt has had a sharp stabbing pain & tightness intermittently in her chest since last night. Gets worse with activity. Pt's primary doctor recommended coming to to ED. )      HPI  (History provided by patient)  This is a 23 y.o. female who was brought in by self for chest tightness that is sharp in nature.  Patient states the chest tightness started today and starts in the center of her chest and radiates to her back.  Patient states she is presently seeing a cardiologist and they are working up for POTS.  Patient states that at times she does feel dizzy and this is the reason why they are concerned about POTS.    I have reviewed the following nursing documentation:  Allergies: Latex and Prozac [fluoxetine]    Past medical history:   Past Medical History:   Diagnosis Date    Bipolar 1 disorder (HCC)     Depression     Herpes simplex virus (HSV) infection     Migraine     Postpartum depression 2019    I had postpartum depression after i had both of my kids    Trauma      Past surgical history:   Past Surgical History:   Procedure Laterality Date     SECTION N/A      SECTION N/A     DILATION AND CURETTAGE OF UTERUS N/A 10/26/2023    DIAGNOSTIC LAPAROSCOPY WITH REMOVAL OF FOREIGN BODY performed by Neda Jerome DO at [facility] OR    TUBAL LIGATION Bilateral        Home medications:   Discharge Medication List as of 2024  8:28 PM        CONTINUE these medications which have NOT CHANGED    Details   Atogepant (QULIPTA) 30 MG TABS Take 1 tab PO daily for chronic migraine prevention., Disp-30 tablet, R-2Normal      tiZANidine (ZANAFLEX) 4 MG tablet Take 1 tablet by mouth every 8 hours as needed (neck, back pain),

## 2024-05-01 LAB
EKG ATRIAL RATE: 62 BPM
EKG DIAGNOSIS: NORMAL
EKG P AXIS: 64 DEGREES
EKG P-R INTERVAL: 138 MS
EKG Q-T INTERVAL: 358 MS
EKG QRS DURATION: 76 MS
EKG QTC CALCULATION (BAZETT): 363 MS
EKG R AXIS: 84 DEGREES
EKG T AXIS: 52 DEGREES
EKG VENTRICULAR RATE: 62 BPM

## 2024-05-01 PROCEDURE — 93010 ELECTROCARDIOGRAM REPORT: CPT | Performed by: INTERNAL MEDICINE

## 2024-05-01 NOTE — ED NOTES
Pt unable to be found after speaking with attending physician, Dr. Bourgeois. Notifed Charge RN and Dr. Bourgeois.

## 2024-05-02 ENCOUNTER — OFFICE VISIT (OUTPATIENT)
Dept: CARDIOLOGY CLINIC | Age: 24
End: 2024-05-02
Payer: COMMERCIAL

## 2024-05-02 VITALS
DIASTOLIC BLOOD PRESSURE: 70 MMHG | WEIGHT: 107.2 LBS | BODY MASS INDEX: 19.29 KG/M2 | HEART RATE: 92 BPM | OXYGEN SATURATION: 99 % | SYSTOLIC BLOOD PRESSURE: 100 MMHG

## 2024-05-02 DIAGNOSIS — R07.2 PRECORDIAL PAIN: Primary | ICD-10-CM

## 2024-05-02 DIAGNOSIS — R07.9 CHEST PAIN, UNSPECIFIED TYPE: ICD-10-CM

## 2024-05-02 PROCEDURE — 99214 OFFICE O/P EST MOD 30 MIN: CPT | Performed by: INTERNAL MEDICINE

## 2024-05-02 ASSESSMENT — ENCOUNTER SYMPTOMS
VOMITING: 0
WHEEZING: 0
ABDOMINAL DISTENTION: 0
EYE DISCHARGE: 0
BACK PAIN: 0
COUGH: 0
COLOR CHANGE: 0
ABDOMINAL PAIN: 0
SHORTNESS OF BREATH: 0
BLOOD IN STOOL: 0
CHEST TIGHTNESS: 0
FACIAL SWELLING: 0

## 2024-05-02 NOTE — PATIENT INSTRUCTIONS
Please call [phone] to schedule CTA.   Take metoprolol tartrate 25 mg 2 days before CTA morning and evening and morning of CTA.

## 2024-05-02 NOTE — ASSESSMENT & PLAN NOTE
Would like to rule out any potential cardiac etiologies including anomalous coronaries.  Plan for CTA morphology.  5 doses of metoprolol to be taken prior to the test

## 2024-05-22 ENCOUNTER — PRE-PROCEDURE TELEPHONE (OUTPATIENT)
Dept: INTERVENTIONAL RADIOLOGY/VASCULAR | Age: 24
End: 2024-05-22

## 2024-05-29 ENCOUNTER — HOSPITAL ENCOUNTER (OUTPATIENT)
Dept: CT IMAGING | Age: 24
Discharge: HOME | End: 2024-05-29
Attending: INTERNAL MEDICINE
Payer: COMMERCIAL

## 2024-05-29 VITALS
WEIGHT: 105 LBS | RESPIRATION RATE: 16 BRPM | SYSTOLIC BLOOD PRESSURE: 121 MMHG | DIASTOLIC BLOOD PRESSURE: 74 MMHG | OXYGEN SATURATION: 100 % | BODY MASS INDEX: 19.32 KG/M2 | HEART RATE: 60 BPM | HEIGHT: 62 IN

## 2024-05-29 DIAGNOSIS — R07.9 CHEST PAIN, UNSPECIFIED TYPE: ICD-10-CM

## 2024-05-29 PROCEDURE — 6360000004 HC RX CONTRAST MEDICATION: Performed by: INTERNAL MEDICINE

## 2024-05-29 PROCEDURE — 75574 CT ANGIO HRT W/3D IMAGE: CPT

## 2024-05-29 RX ADMIN — IOPAMIDOL 85 ML: 755 INJECTION, SOLUTION INTRAVENOUS at 08:43

## 2024-05-29 NOTE — FLOWSHEET NOTE
Pt tolerated procedure well. VSS. IV removed without difficulty. Pt given d/c instructions and stated understanding. Released in stable conditionto home

## 2024-05-29 NOTE — PROGRESS NOTES
Pt arrives to pre procedure area in stable condition from home. Vital signs stable.  Assessment completed see flow sheet.  IV patent.

## 2024-07-01 ENCOUNTER — PATIENT MESSAGE (OUTPATIENT)
Dept: PRIMARY CARE CLINIC | Age: 24
End: 2024-07-01

## 2024-07-01 NOTE — TELEPHONE ENCOUNTER
From: Shasha Barclay  To: Shannon Taylor  Sent: 7/1/2024 2:44 PM EDT  Subject: Pelvic pain    Hey, I’ve been having pelvic pain again for the past 2 weeks now. It feels crampy, but if I cough or laugh/ move too quickly I get a sharp pain that starts near my pelvis and goes towards my ribs. I’m not sure what’s going on but it’s very painful.

## 2024-08-14 DIAGNOSIS — A60.00 GENITAL HERPES SIMPLEX, UNSPECIFIED SITE: ICD-10-CM

## 2024-08-14 RX ORDER — VALACYCLOVIR HYDROCHLORIDE 500 MG/1
500 TABLET, FILM COATED ORAL DAILY
Qty: 30 TABLET | Refills: 5 | Status: SHIPPED | OUTPATIENT
Start: 2024-08-14

## 2024-08-14 NOTE — TELEPHONE ENCOUNTER
Medication:   Requested Prescriptions     Pending Prescriptions Disp Refills    valACYclovir (VALTREX) 500 MG tablet 30 tablet 5     Sig: Take 1 tablet by mouth daily     Last Filled:  6/6/2023    Last appt: 4/9/2024   Next appt: Visit date not found    Last OARRS:        No data to display

## 2024-09-23 ENCOUNTER — OFFICE VISIT (OUTPATIENT)
Dept: CARDIOLOGY CLINIC | Age: 24
End: 2024-09-23
Payer: COMMERCIAL

## 2024-09-23 VITALS
BODY MASS INDEX: 19.42 KG/M2 | SYSTOLIC BLOOD PRESSURE: 92 MMHG | WEIGHT: 106.2 LBS | HEART RATE: 76 BPM | DIASTOLIC BLOOD PRESSURE: 60 MMHG

## 2024-09-23 DIAGNOSIS — R07.2 PRECORDIAL PAIN: ICD-10-CM

## 2024-09-23 DIAGNOSIS — R06.02 SHORTNESS OF BREATH: Primary | ICD-10-CM

## 2024-09-23 DIAGNOSIS — R06.02 SOB (SHORTNESS OF BREATH): ICD-10-CM

## 2024-09-23 PROCEDURE — 99214 OFFICE O/P EST MOD 30 MIN: CPT | Performed by: INTERNAL MEDICINE

## 2024-09-23 ASSESSMENT — ENCOUNTER SYMPTOMS: SHORTNESS OF BREATH: 1

## 2024-10-21 NOTE — PROGRESS NOTES
ENCOUNTER DATE: 10/22/2024     NAME: Shasha Barclay   AGE: 24 y.o.   GENDER: female   YOB: 2000    Chief Complaint   Patient presents with    Migraine    Nausea     Pt feels like this all day     Other     Pt says she is either freezing or exteremly hot no in between        ASSESSMENT/PLAN:  1. Generalized abdominal pain  Discussed differentials  Check updated labs today  Trial of PPI  Refer to GI for further evaluation.  May need EGD and/or C-scope  - Lipase; Future  - omeprazole (PRILOSEC) 20 MG delayed release capsule; Take 1 capsule by mouth every morning (before breakfast)  Dispense: 30 capsule; Refill: 0  - [facility] - Fred He MD, Gastroenterology, [facility]    2. Nausea  - CBC; Future  - Comprehensive Metabolic Panel; Future  - TSH with Reflex to FT4 ([place] Only); Future  - Lipase; Future  - ZORAIDA - Fred He MD, Gastroenterology, [facility]    3. Bowel habit changes  - Fred Lantigua MD, Gastroenterology, [facility]    4. Weight loss, unintentional  Check updated labs  Refer to GI for further evaluation  - Comprehensive Metabolic Panel; Future  - TSH with Reflex to FT4 ([place] Only); Future  - ZORAIDA - Fred He MD, Gastroenterology, [facility]    5. Hot flashes  Check updated labs  If lab workup negative, encouraged to follow-up with GYN to discuss checking hormone levels  - CBC; Future  - Comprehensive Metabolic Panel; Future  - TSH with Reflex to FT4 ([place] Only); Future    6.  Borderline personality disorder  Was following with psychiatry.  States she did not tolerate the medications and is currently looking for another psychiatrist    7.  Chronic posttraumatic stress disorder  Patient currently looking for another psychiatrist    8.  OWEN  Not currently on medication    9.  Intractable chronic migraine with aura  Neurology notes reviewed.  Continue with regimen as prescribed by neurology.  Encouraged to schedule follow-up with neuro

## 2024-10-22 ENCOUNTER — OFFICE VISIT (OUTPATIENT)
Dept: PRIMARY CARE CLINIC | Age: 24
End: 2024-10-22
Payer: COMMERCIAL

## 2024-10-22 VITALS
HEART RATE: 78 BPM | SYSTOLIC BLOOD PRESSURE: 110 MMHG | DIASTOLIC BLOOD PRESSURE: 70 MMHG | OXYGEN SATURATION: 97 % | WEIGHT: 103 LBS | BODY MASS INDEX: 18.84 KG/M2 | RESPIRATION RATE: 16 BRPM | TEMPERATURE: 97.6 F

## 2024-10-22 DIAGNOSIS — F41.1 GAD (GENERALIZED ANXIETY DISORDER): ICD-10-CM

## 2024-10-22 DIAGNOSIS — R23.2 HOT FLASHES: ICD-10-CM

## 2024-10-22 DIAGNOSIS — G43.E19 INTRACTABLE CHRONIC MIGRAINE WITH AURA AND WITHOUT STATUS MIGRAINOSUS: ICD-10-CM

## 2024-10-22 DIAGNOSIS — F60.3 BORDERLINE PERSONALITY DISORDER (HCC): ICD-10-CM

## 2024-10-22 DIAGNOSIS — R11.0 NAUSEA: ICD-10-CM

## 2024-10-22 DIAGNOSIS — F43.12 CHRONIC POST-TRAUMATIC STRESS DISORDER: ICD-10-CM

## 2024-10-22 DIAGNOSIS — G89.29 CHRONIC PELVIC PAIN IN FEMALE: ICD-10-CM

## 2024-10-22 DIAGNOSIS — R63.4 WEIGHT LOSS, UNINTENTIONAL: ICD-10-CM

## 2024-10-22 DIAGNOSIS — R19.4 BOWEL HABIT CHANGES: ICD-10-CM

## 2024-10-22 DIAGNOSIS — R10.84 GENERALIZED ABDOMINAL PAIN: Primary | ICD-10-CM

## 2024-10-22 DIAGNOSIS — R10.2 CHRONIC PELVIC PAIN IN FEMALE: ICD-10-CM

## 2024-10-22 PROBLEM — F33.1 MODERATE EPISODE OF RECURRENT MAJOR DEPRESSIVE DISORDER (HCC): Status: RESOLVED | Noted: 2022-07-19 | Resolved: 2024-10-22

## 2024-10-22 PROCEDURE — 99214 OFFICE O/P EST MOD 30 MIN: CPT | Performed by: NURSE PRACTITIONER

## 2024-10-22 RX ORDER — RIZATRIPTAN BENZOATE 10 MG/1
10 TABLET ORAL
COMMUNITY

## 2024-10-22 ASSESSMENT — ENCOUNTER SYMPTOMS
BLOOD IN STOOL: 0
NAUSEA: 1
ANAL BLEEDING: 0
SHORTNESS OF BREATH: 1
BACK PAIN: 1
ABDOMINAL DISTENTION: 0
ABDOMINAL PAIN: 1
COUGH: 0
SORE THROAT: 0
CONSTIPATION: 1
DIARRHEA: 1
VOMITING: 0
WHEEZING: 0

## (undated) DEVICE — CORD ES L10FT MPLR LAP

## (undated) DEVICE — INVIEW CLEAR LEGGINGS: Brand: CONVERTORS

## (undated) DEVICE — APPLICATOR MEDICATED 26 CC SOLUTION HI LT ORNG CHLORAPREP

## (undated) DEVICE — UNDERPAD HOSP W30XL36IN GRN POLYPR HI ABSRB DISP

## (undated) DEVICE — DRAPE,UNDERBUTTOCKS,PCH,STERILE: Brand: MEDLINE

## (undated) DEVICE — SOLUTION IV IRRIG 500ML 0.9% SODIUM CHL 2F7123

## (undated) DEVICE — GAUZE,SPONGE,2"X2",8PLY,STERILE,LF,2'S: Brand: MEDLINE

## (undated) DEVICE — TISSUE RETRIEVAL SYSTEM: Brand: INZII RETRIEVAL SYSTEM

## (undated) DEVICE — SYRINGE MED 10ML LUERLOCK TIP W/O SFTY DISP

## (undated) DEVICE — LAPAROSCOPY PACK: Brand: MEDLINE INDUSTRIES, INC.

## (undated) DEVICE — PACK,LAPAROSCOPY,PK II,SIRUS: Brand: MEDLINE

## (undated) DEVICE — SUTURE MCRYL + SZ 4-0 L18IN ABSRB UD L19MM PS-2 3/8 CIR MCP496G

## (undated) DEVICE — PUMP SUC IRR TBNG L10FT W/ HNDPC ASSEMB STRYKEFLOW 2

## (undated) DEVICE — 3M™ STERI-STRIP™ REINFORCED ADHESIVE SKIN CLOSURES, R1547, 1/2 IN X 4 IN (12 MM X 100 MM), 6 STRIPS/ENVELOPE: Brand: 3M™ STERI-STRIP™

## (undated) DEVICE — GOWN,SIRUS,NON REINFRCD,LARGE,SET IN SL: Brand: MEDLINE

## (undated) DEVICE — ELECTRODE PT RET AD L9FT HI MOIST COND ADH HYDRGEL CORDED

## (undated) DEVICE — PREMIUM WET SKIN PREP TRAY: Brand: MEDLINE INDUSTRIES, INC.

## (undated) DEVICE — TROCAR: Brand: KII FIOS FIRST ENTRY

## (undated) DEVICE — GLOVE SURG SZ 65 L12IN FNGR THK94MIL STD WHT LTX FREE

## (undated) DEVICE — GLOVE,SURG,SENSICARE,ALOE,LF,PF,6: Brand: MEDLINE